# Patient Record
Sex: FEMALE | Race: BLACK OR AFRICAN AMERICAN | NOT HISPANIC OR LATINO | Employment: PART TIME | URBAN - METROPOLITAN AREA
[De-identification: names, ages, dates, MRNs, and addresses within clinical notes are randomized per-mention and may not be internally consistent; named-entity substitution may affect disease eponyms.]

---

## 2021-10-06 LAB
HUMAN PAPILLOMAVIRUS (HPV): NORMAL
PAP RECOMMENDATION EXT: NORMAL
PAP SMEAR: NORMAL

## 2022-04-07 ENCOUNTER — HISTORICAL (OUTPATIENT)
Dept: ADMINISTRATIVE | Facility: HOSPITAL | Age: 22
End: 2022-04-07
Payer: MEDICAID

## 2022-04-24 VITALS
SYSTOLIC BLOOD PRESSURE: 127 MMHG | OXYGEN SATURATION: 99 % | DIASTOLIC BLOOD PRESSURE: 86 MMHG | WEIGHT: 125.69 LBS | BODY MASS INDEX: 19.73 KG/M2 | HEIGHT: 67 IN

## 2022-07-08 ENCOUNTER — HOSPITAL ENCOUNTER (EMERGENCY)
Facility: HOSPITAL | Age: 22
Discharge: HOME | End: 2022-07-08
Attending: EMERGENCY MEDICINE
Payer: MEDICAID

## 2022-07-08 VITALS
OXYGEN SATURATION: 100 % | HEART RATE: 75 BPM | SYSTOLIC BLOOD PRESSURE: 118 MMHG | BODY MASS INDEX: 19.48 KG/M2 | DIASTOLIC BLOOD PRESSURE: 78 MMHG | TEMPERATURE: 98 F | RESPIRATION RATE: 18 BRPM | HEIGHT: 67 IN | WEIGHT: 124.13 LBS

## 2022-07-08 DIAGNOSIS — N93.9 ABNORMAL UTERINE BLEEDING: Primary | ICD-10-CM

## 2022-07-08 DIAGNOSIS — R11.0 NAUSEA: ICD-10-CM

## 2022-07-08 LAB
ALBUMIN SERPL-MCNC: 4.3 GM/DL (ref 3.5–5)
ALBUMIN/GLOB SERPL: 1.1 RATIO (ref 1.1–2)
ALP SERPL-CCNC: 45 UNIT/L (ref 40–150)
ALT SERPL-CCNC: 10 UNIT/L (ref 0–55)
APPEARANCE UR: CLEAR
AST SERPL-CCNC: 17 UNIT/L (ref 5–34)
B-HCG UR QL: NEGATIVE
BACTERIA #/AREA URNS AUTO: ABNORMAL /HPF
BASOPHILS # BLD AUTO: 0.06 X10(3)/MCL (ref 0–0.2)
BASOPHILS NFR BLD AUTO: 0.8 %
BILIRUB UR QL STRIP.AUTO: NEGATIVE MG/DL
BILIRUBIN DIRECT+TOT PNL SERPL-MCNC: 0.6 MG/DL
BUN SERPL-MCNC: 12.2 MG/DL (ref 7–18.7)
CALCIUM SERPL-MCNC: 9.8 MG/DL (ref 8.4–10.2)
CHLORIDE SERPL-SCNC: 106 MMOL/L (ref 98–107)
CO2 SERPL-SCNC: 22 MMOL/L (ref 22–29)
COLOR UR AUTO: YELLOW
CREAT SERPL-MCNC: 0.77 MG/DL (ref 0.55–1.02)
CTP QC/QA: YES
EOSINOPHIL # BLD AUTO: 0.14 X10(3)/MCL (ref 0–0.9)
EOSINOPHIL NFR BLD AUTO: 1.8 %
ERYTHROCYTE [DISTWIDTH] IN BLOOD BY AUTOMATED COUNT: 13 % (ref 11.5–17)
GLOBULIN SER-MCNC: 3.9 GM/DL (ref 2.4–3.5)
GLUCOSE SERPL-MCNC: 76 MG/DL (ref 74–100)
GLUCOSE UR QL STRIP.AUTO: NORMAL MG/DL
HCT VFR BLD AUTO: 44.8 % (ref 37–47)
HGB BLD-MCNC: 13.6 GM/DL (ref 12–16)
HYALINE CASTS #/AREA URNS LPF: ABNORMAL /LPF
IMM GRANULOCYTES # BLD AUTO: 0.03 X10(3)/MCL (ref 0–0.04)
IMM GRANULOCYTES NFR BLD AUTO: 0.4 %
KETONES UR QL STRIP.AUTO: ABNORMAL MG/DL
LEUKOCYTE ESTERASE UR QL STRIP.AUTO: NEGATIVE UNIT/L
LYMPHOCYTES # BLD AUTO: 2.69 X10(3)/MCL (ref 0.6–4.6)
LYMPHOCYTES NFR BLD AUTO: 34.1 %
MCH RBC QN AUTO: 24.7 PG (ref 27–31)
MCHC RBC AUTO-ENTMCNC: 30.4 MG/DL (ref 33–36)
MCV RBC AUTO: 81.5 FL (ref 80–94)
MONOCYTES # BLD AUTO: 0.46 X10(3)/MCL (ref 0.1–1.3)
MONOCYTES NFR BLD AUTO: 5.8 %
MUCOUS THREADS URNS QL MICRO: ABNORMAL /LPF
NEUTROPHILS # BLD AUTO: 4.5 X10(3)/MCL (ref 2.1–9.2)
NEUTROPHILS NFR BLD AUTO: 57.1 %
NITRITE UR QL STRIP.AUTO: NEGATIVE
NRBC BLD AUTO-RTO: 0 %
PH UR STRIP.AUTO: 6 [PH]
PLATELET # BLD AUTO: 405 X10(3)/MCL (ref 130–400)
PMV BLD AUTO: 10.4 FL (ref 7.4–10.4)
POTASSIUM SERPL-SCNC: 3.9 MMOL/L (ref 3.5–5.1)
PROT SERPL-MCNC: 8.2 GM/DL (ref 6.4–8.3)
PROT UR QL STRIP.AUTO: ABNORMAL MG/DL
RBC # BLD AUTO: 5.5 X10(6)/MCL (ref 4.2–5.4)
RBC #/AREA URNS AUTO: ABNORMAL /HPF
RBC UR QL AUTO: ABNORMAL UNIT/L
SODIUM SERPL-SCNC: 137 MMOL/L (ref 136–145)
SP GR UR STRIP.AUTO: 1.04
SQUAMOUS #/AREA URNS LPF: ABNORMAL /HPF
UROBILINOGEN UR STRIP-ACNC: ABNORMAL MG/DL
WBC # SPEC AUTO: 7.9 X10(3)/MCL (ref 4.5–11.5)
WBC #/AREA URNS AUTO: ABNORMAL /HPF

## 2022-07-08 PROCEDURE — 85025 COMPLETE CBC W/AUTO DIFF WBC: CPT | Performed by: PHYSICIAN ASSISTANT

## 2022-07-08 PROCEDURE — 81025 URINE PREGNANCY TEST: CPT | Performed by: PHYSICIAN ASSISTANT

## 2022-07-08 PROCEDURE — 81001 URINALYSIS AUTO W/SCOPE: CPT | Performed by: PHYSICIAN ASSISTANT

## 2022-07-08 PROCEDURE — 36415 COLL VENOUS BLD VENIPUNCTURE: CPT | Performed by: PHYSICIAN ASSISTANT

## 2022-07-08 PROCEDURE — 80053 COMPREHEN METABOLIC PANEL: CPT | Performed by: PHYSICIAN ASSISTANT

## 2022-07-08 PROCEDURE — 99284 EMERGENCY DEPT VISIT MOD MDM: CPT | Mod: 25

## 2022-07-08 RX ORDER — ONDANSETRON 4 MG/1
4 TABLET, FILM COATED ORAL EVERY 6 HOURS
Qty: 12 TABLET | Refills: 0 | Status: SHIPPED | OUTPATIENT
Start: 2022-07-08 | End: 2022-08-04 | Stop reason: SDUPTHER

## 2022-07-08 RX ORDER — DICLOFENAC SODIUM 75 MG/1
75 TABLET, DELAYED RELEASE ORAL 2 TIMES DAILY
Qty: 20 TABLET | Refills: 0 | Status: SHIPPED | OUTPATIENT
Start: 2022-07-08 | End: 2022-07-18

## 2022-07-08 NOTE — Clinical Note
"Dereck George" Chance was seen and treated in our emergency department on 7/8/2022.  She may return to work on 07/11/2022.       If you have any questions or concerns, please don't hesitate to call.      Marybel Wiseman PA-C"

## 2022-07-09 NOTE — ED PROVIDER NOTES
"Encounter Date: 7/8/2022       History     Chief Complaint   Patient presents with    Abdominal Pain     C/o vaginal bleeding since November. Also c/o continued nausea, cramping, tiredness.     Vaginal Bleeding    Nausea     Dereck Fletcher is a 21 y.o. female who presents to the ED with complaints of vaginal bleeding that started 9 month(s) ago. She reports she is followed by GYN here at [facility] and has an appt next week. She states she is currently on the Depo shot and her last injection was 3 months ago. She states she has to wear a panty liner daily due to bleeding and reports "I have one week out of each month where the bleeding is heavier." She reports intermittent nausea, cramping, and fatigue but denies all currently. Concerned she is now anemic.        The history is provided by the patient. No  was used.     Review of patient's allergies indicates:  No Known Allergies  History reviewed. No pertinent past medical history.  History reviewed. No pertinent surgical history.  History reviewed. No pertinent family history.  Social History     Tobacco Use    Smoking status: Current Every Day Smoker     Packs/day: 1.00     Types: Cigarettes    Smokeless tobacco: Never Used   Substance Use Topics    Alcohol use: Never    Drug use: Yes     Types: Marijuana     Comment: q month     Review of Systems   Constitutional: Negative for chills, fatigue and fever.   HENT: Negative for congestion, ear pain, sinus pain and sore throat.    Eyes: Negative for pain.   Respiratory: Negative for cough, chest tightness and shortness of breath.    Cardiovascular: Negative for chest pain.   Gastrointestinal: Negative for abdominal pain, constipation, diarrhea, nausea and vomiting.   Genitourinary: Positive for menstrual problem and vaginal bleeding. Negative for decreased urine volume, dysuria, frequency, pelvic pain, urgency, vaginal discharge and vaginal pain.   Musculoskeletal: Negative for back pain and " joint swelling.   Skin: Negative for color change and rash.   Neurological: Negative for dizziness, syncope, weakness, light-headedness and headaches.   Psychiatric/Behavioral: Negative for behavioral problems and confusion.       Physical Exam     Initial Vitals [07/08/22 1822]   BP Pulse Resp Temp SpO2   (!) 142/90 107 20 97.9 °F (36.6 °C) 100 %      MAP       --         Physical Exam    Constitutional: She appears well-developed and well-nourished.   HENT:   Head: Normocephalic and atraumatic.   Nose: Nose normal.   Eyes: EOM are normal. Pupils are equal, round, and reactive to light.   Neck: Neck supple. No thyromegaly present. No JVD present.   Normal range of motion.  Cardiovascular: Normal rate, regular rhythm, normal heart sounds and intact distal pulses.   No murmur heard.  Pulmonary/Chest: Breath sounds normal. No respiratory distress. She has no wheezes. She has no rhonchi. She has no rales. She exhibits no tenderness.   Abdominal: Abdomen is soft. Bowel sounds are normal. She exhibits no distension. There is no abdominal tenderness. There is no rebound and no guarding.   Musculoskeletal:         General: No tenderness or edema. Normal range of motion.      Cervical back: Normal range of motion and neck supple.     Lymphadenopathy:     She has no cervical adenopathy.   Neurological: She is alert and oriented to person, place, and time.   Skin: Skin is warm and dry. Capillary refill takes less than 2 seconds.   Psychiatric: She has a normal mood and affect. Thought content normal.         ED Course   Procedures  Labs Reviewed   COMPREHENSIVE METABOLIC PANEL - Abnormal; Notable for the following components:       Result Value    Globulin 3.9 (*)     All other components within normal limits   URINALYSIS, REFLEX TO URINE CULTURE - Abnormal; Notable for the following components:    Protein, UA Trace (*)     Ketones, UA Trace (*)     Blood, UA 2+ (*)     Urobilinogen, UA 1+ (*)     Squamous Epithelial Cells,  UA Trace (*)     Mucous, UA Moderate (*)     All other components within normal limits   CBC WITH DIFFERENTIAL - Abnormal; Notable for the following components:    RBC 5.50 (*)     MCH 24.7 (*)     MCHC 30.4 (*)     Platelet 405 (*)     All other components within normal limits   CBC W/ AUTO DIFFERENTIAL    Narrative:     The following orders were created for panel order CBC auto differential.  Procedure                               Abnormality         Status                     ---------                               -----------         ------                     CBC with Differential[[id number]]        Abnormal            Final result                 Please view results for these tests on the individual orders.   EXTRA TUBES    Narrative:     The following orders were created for panel order EXTRA TUBES.  Procedure                               Abnormality         Status                     ---------                               -----------         ------                     Light Blue Top Hold[[id number]]                              In process                 Gold Top Hold[[id number]]                                    In process                   Please view results for these tests on the individual orders.   LIGHT BLUE TOP HOLD   GOLD TOP HOLD   POCT URINE PREGNANCY          Imaging Results    None          Medications - No data to display              ED Course as of 07/08/22 2023 Fri Jul 08, 2022 2020 Reassessed patient at this time. She is sitting comfortably in the exam chair. Discussed lab results, diagnosis, and treatment plan with her. I will DC her home with Zofran and Diclofenac as needed for symptoms. Instructed her to keep her appt next week with GYN. She verbalized understanding. Strict return precautions given. Stable for discharge.  [VJ]      ED Course User Index  [VJ] Marybel Wiseman PA-C             Clinical Impression:   Final diagnoses:  [N93.9] Abnormal uterine bleeding  (Primary)  [R11.0] Nausea          ED Disposition Condition    Discharge Stable        ED Prescriptions     Medication Sig Dispense Start Date End Date Auth. Provider    diclofenac (VOLTAREN) 75 MG EC tablet Take 1 tablet (75 mg total) by mouth 2 (two) times daily. for 10 days 20 tablet 7/8/2022 7/18/2022 Marybel Wiseman PA-C    ondansetron (ZOFRAN) 4 MG tablet Take 1 tablet (4 mg total) by mouth every 6 (six) hours. 12 tablet 7/8/2022  Marybel Wiseman PA-C        Follow-up Information     Follow up With Specialties Details Why Contact Info    [facility] Emergency Dept Emergency Medicine In 3 days As needed, If symptoms worsen [address] [id number] [phone]    [facility]  In 1 week  [address] [id number]           Marybel Wiseman PA-C  07/08/22 2023

## 2022-07-14 ENCOUNTER — OFFICE VISIT (OUTPATIENT)
Dept: GYNECOLOGY | Facility: CLINIC | Age: 22
End: 2022-07-14
Payer: MEDICAID

## 2022-07-14 VITALS
WEIGHT: 120 LBS | DIASTOLIC BLOOD PRESSURE: 86 MMHG | HEIGHT: 67 IN | HEART RATE: 77 BPM | BODY MASS INDEX: 18.83 KG/M2 | SYSTOLIC BLOOD PRESSURE: 130 MMHG | TEMPERATURE: 98 F

## 2022-07-14 DIAGNOSIS — N93.9 ABNORMAL UTERINE BLEEDING (AUB): Primary | ICD-10-CM

## 2022-07-14 DIAGNOSIS — Z30.9 ENCOUNTER FOR CONTRACEPTIVE MANAGEMENT, UNSPECIFIED TYPE: ICD-10-CM

## 2022-07-14 LAB
B-HCG UR QL: NEGATIVE
CTP QC/QA: YES

## 2022-07-14 PROCEDURE — 3008F PR BODY MASS INDEX (BMI) DOCUMENTED: ICD-10-PCS | Mod: CPTII,,, | Performed by: NURSE PRACTITIONER

## 2022-07-14 PROCEDURE — 99213 PR OFFICE/OUTPT VISIT, EST, LEVL III, 20-29 MIN: ICD-10-PCS | Mod: S$PBB,,, | Performed by: NURSE PRACTITIONER

## 2022-07-14 PROCEDURE — 3079F DIAST BP 80-89 MM HG: CPT | Mod: CPTII,,, | Performed by: NURSE PRACTITIONER

## 2022-07-14 PROCEDURE — 81025 URINE PREGNANCY TEST: CPT | Mod: PBBFAC | Performed by: NURSE PRACTITIONER

## 2022-07-14 PROCEDURE — 99213 OFFICE O/P EST LOW 20 MIN: CPT | Mod: S$PBB,,, | Performed by: NURSE PRACTITIONER

## 2022-07-14 PROCEDURE — 1160F PR REVIEW ALL MEDS BY PRESCRIBER/CLIN PHARMACIST DOCUMENTED: ICD-10-PCS | Mod: CPTII,,, | Performed by: NURSE PRACTITIONER

## 2022-07-14 PROCEDURE — 3079F PR MOST RECENT DIASTOLIC BLOOD PRESSURE 80-89 MM HG: ICD-10-PCS | Mod: CPTII,,, | Performed by: NURSE PRACTITIONER

## 2022-07-14 PROCEDURE — 3075F SYST BP GE 130 - 139MM HG: CPT | Mod: CPTII,,, | Performed by: NURSE PRACTITIONER

## 2022-07-14 PROCEDURE — 1159F PR MEDICATION LIST DOCUMENTED IN MEDICAL RECORD: ICD-10-PCS | Mod: CPTII,,, | Performed by: NURSE PRACTITIONER

## 2022-07-14 PROCEDURE — 1159F MED LIST DOCD IN RCRD: CPT | Mod: CPTII,,, | Performed by: NURSE PRACTITIONER

## 2022-07-14 PROCEDURE — 3075F PR MOST RECENT SYSTOLIC BLOOD PRESS GE 130-139MM HG: ICD-10-PCS | Mod: CPTII,,, | Performed by: NURSE PRACTITIONER

## 2022-07-14 PROCEDURE — 1160F RVW MEDS BY RX/DR IN RCRD: CPT | Mod: CPTII,,, | Performed by: NURSE PRACTITIONER

## 2022-07-14 PROCEDURE — 99214 OFFICE O/P EST MOD 30 MIN: CPT | Mod: PBBFAC | Performed by: NURSE PRACTITIONER

## 2022-07-14 PROCEDURE — 3008F BODY MASS INDEX DOCD: CPT | Mod: CPTII,,, | Performed by: NURSE PRACTITIONER

## 2022-07-14 RX ORDER — NORGESTIMATE AND ETHINYL ESTRADIOL 0.25-0.035
1 KIT ORAL DAILY
Qty: 30 TABLET | Refills: 11 | Status: SHIPPED | OUTPATIENT
Start: 2022-07-14 | End: 2023-04-20

## 2022-07-14 NOTE — PROGRESS NOTES
"Patient ID: Dereck Fletcher is a 21 y.o. female.    Chief Complaint: AUB    Review of patient's allergies indicates:  No Known Allergies        HPI:  Pt is G0 scheduled today for persistent AUB on depo provera. She was started on the injections 10/2021 for dual benefit of dysmenorrhea/contraception. Pt states has had some sort of bleeding/spotting since starting the injection. She had 3 injections thus far and was scheduled today for her 4th injection but informed the nurse that she no longer wishes to continue and wanted a different method. She used OCPs in the past which helped her dysmenorrhea but states would occasionally forget to take the pill .States has some bloating and abdominal cramping. H/H on 7/8/22=13/44. TSH 3.1 on 4/2022. Denies hx of blood transfusion. Denies any medical hx. Pap 3/2022=NIL;HPV negative. GC negative. Pt states is currently bleeding today.      Review of Systems:   Negative except for findings in HPI     Objective:   /86 (BP Location: Right arm)   Pulse 77   Temp 97.9 °F (36.6 °C) (Oral)   Ht 5' 7" (1.702 m)   Wt 54.4 kg (120 lb)   LMP 07/05/2022 (Approximate)   BMI 18.79 kg/m²    Physical Exam:  GENERAL: Pt is aware and alert and  in no acute distress.  ABDOMEN: Soft, non tender.  VULVA:  No lesions or skin changes.  URETHRA: No lesions  BLADDER: No tenderness.  VAGINA: Mucosa normal,small amount of blood  CERVIX:  no CMT, NO discharge; NO lesions  BIMANUAL EXAM: reveals an 8week-sized uterus.Anteverted The uterus is mobile, nontender, no palpable masses. Theo adnexa reveal no evidence of masses; no fullness   SKIN: Warm and Dry  PSYCHIATRIC: Patient is awake and alert. Mood and affect are normal.    Assessment:   Abnormal uterine bleeding (AUB)  -     POCT urine pregnancy  -     US Pelvis Comp with Transvag NON-OB (xpd; Future; Expected date: 07/21/2022    Encounter for contraceptive management, unspecified type    Other orders  -     norgestimate-ethinyl estradioL " (ORTHO-CYCLEN) 0.25-35 mg-mcg per tablet; Take 1 tablet by mouth once daily.  Dispense: 30 tablet; Refill: 11            1. Abnormal uterine bleeding (AUB)    2. Encounter for contraceptive management, unspecified type             -discussed risks/benefits of OCPs. Pt accepts. Informed pt may take 3 months for period regulation. Will check US in the meantime.  Plan:       Follow up in about 6 months (around 1/14/2023).

## 2022-07-21 ENCOUNTER — PATIENT MESSAGE (OUTPATIENT)
Dept: ADMINISTRATIVE | Facility: OTHER | Age: 22
End: 2022-07-21
Payer: MEDICAID

## 2022-07-23 ENCOUNTER — OFFICE VISIT (OUTPATIENT)
Dept: URGENT CARE | Facility: CLINIC | Age: 22
End: 2022-07-23
Payer: MEDICAID

## 2022-07-23 VITALS
DIASTOLIC BLOOD PRESSURE: 74 MMHG | RESPIRATION RATE: 18 BRPM | BODY MASS INDEX: 20.2 KG/M2 | OXYGEN SATURATION: 99 % | WEIGHT: 128.69 LBS | TEMPERATURE: 98 F | HEART RATE: 62 BPM | HEIGHT: 67 IN | SYSTOLIC BLOOD PRESSURE: 110 MMHG

## 2022-07-23 DIAGNOSIS — J02.9 SORE THROAT: ICD-10-CM

## 2022-07-23 DIAGNOSIS — Z11.52 ENCOUNTER FOR SCREENING FOR COVID-19: ICD-10-CM

## 2022-07-23 DIAGNOSIS — R11.0 NAUSEA: ICD-10-CM

## 2022-07-23 DIAGNOSIS — J02.9 ACUTE PHARYNGITIS, UNSPECIFIED ETIOLOGY: Primary | ICD-10-CM

## 2022-07-23 DIAGNOSIS — R68.89 FLU-LIKE SYMPTOMS: ICD-10-CM

## 2022-07-23 LAB
CTP QC/QA: YES
FLUAV AG NPH QL: NEGATIVE
FLUBV AG NPH QL: NEGATIVE
MOLECULAR STREP A: NEGATIVE
SARS-COV-2 RDRP RESP QL NAA+PROBE: NEGATIVE

## 2022-07-23 PROCEDURE — 99213 OFFICE O/P EST LOW 20 MIN: CPT | Mod: S$PBB,,, | Performed by: NURSE PRACTITIONER

## 2022-07-23 PROCEDURE — 87651 STREP A DNA AMP PROBE: CPT | Mod: PBBFAC | Performed by: NURSE PRACTITIONER

## 2022-07-23 PROCEDURE — 87081 CULTURE SCREEN ONLY: CPT | Performed by: NURSE PRACTITIONER

## 2022-07-23 PROCEDURE — U0002 COVID-19 LAB TEST NON-CDC: HCPCS | Mod: PBBFAC | Performed by: NURSE PRACTITIONER

## 2022-07-23 PROCEDURE — 87804 INFLUENZA ASSAY W/OPTIC: CPT | Mod: 59,PBBFAC | Performed by: NURSE PRACTITIONER

## 2022-07-23 PROCEDURE — 99213 PR OFFICE/OUTPT VISIT, EST, LEVL III, 20-29 MIN: ICD-10-PCS | Mod: S$PBB,,, | Performed by: NURSE PRACTITIONER

## 2022-07-23 PROCEDURE — 99213 OFFICE O/P EST LOW 20 MIN: CPT | Mod: PBBFAC | Performed by: NURSE PRACTITIONER

## 2022-07-23 RX ORDER — DICLOFENAC SODIUM 25 MG/1
75 TABLET, DELAYED RELEASE ORAL 2 TIMES DAILY
COMMUNITY
End: 2023-02-26 | Stop reason: SDUPTHER

## 2022-07-23 RX ORDER — ONDANSETRON 4 MG/1
8 TABLET, ORALLY DISINTEGRATING ORAL EVERY 6 HOURS PRN
Qty: 10 TABLET | Refills: 0 | Status: SHIPPED | OUTPATIENT
Start: 2022-07-23 | End: 2022-07-28

## 2022-07-23 RX ORDER — AMOXICILLIN 500 MG/1
500 CAPSULE ORAL EVERY 8 HOURS
Qty: 30 CAPSULE | Refills: 0 | Status: SHIPPED | OUTPATIENT
Start: 2022-07-23 | End: 2022-08-02

## 2022-07-23 NOTE — PROGRESS NOTES
"Subjective:       Patient ID: Dereck Fletcher is a 21 y.o. female.    Vitals:  height is 5' 7.01" (1.702 m) and weight is 58.4 kg (128 lb 11.2 oz). Her oral temperature is 98.2 °F (36.8 °C). Her blood pressure is 110/74 and her pulse is 62. Her respiration is 18 and oxygen saturation is 99%.     Chief Complaint: Nausea, Sore Throat, and Fever    Patient is a 21-year-old female, here today for nausea, sore throat, fever over the past few days.  Patient states she has been having ongoing nausea, was previously stain and prescribe Zofran which she states she ran out of.  Has appointment with PCP in August.  Denies vomiting, diarrhea, abdominal pain, acid reflux.      Constitution: Negative.   HENT: Positive for sore throat.    Neck: neck negative.   Cardiovascular: Negative.    Respiratory: Negative.    Gastrointestinal: Positive for nausea.       Objective:      Physical Exam   Constitutional: She is oriented to person, place, and time. She appears well-developed.   HENT:   Head: Normocephalic.   Ears:   Right Ear: Tympanic membrane normal.   Left Ear: Tympanic membrane normal.   Nose: Nose normal.   Mouth/Throat: Posterior oropharyngeal erythema present.   Eyes: Conjunctivae and EOM are normal. Pupils are equal, round, and reactive to light.   Neck: Neck supple.   Cardiovascular: Normal rate, regular rhythm and normal heart sounds.   Pulmonary/Chest: Effort normal and breath sounds normal.   Abdominal: Bowel sounds are normal. Soft.   Musculoskeletal: Normal range of motion.         General: Normal range of motion.   Neurological: She is alert and oriented to person, place, and time.   Skin: Skin is warm and dry. Capillary refill takes less than 2 seconds.   Psychiatric: Her behavior is normal.   Vitals reviewed.        Assessment:       1. Acute pharyngitis, unspecified etiology    2. Encounter for screening for COVID-19    3. Flu-like symptoms    4. Sore throat    5. Nausea            Office Visit on 07/23/2022 "   Component Date Value Ref Range Status    POC Rapid COVID 07/23/2022 Negative  Negative Final     Acceptable 07/23/2022 Yes   Final    Rapid Influenza A Ag 07/23/2022 Negative  Negative Final    Rapid Influenza B Ag 07/23/2022 Negative  Negative Final     Acceptable 07/23/2022 Yes   Final    Molecular Strep A, POC 07/23/2022 Negative  Negative Final     Acceptable 07/23/2022 Yes   Final        No results found.   Plan:           Rapid Strep neg, will treat based on clinical symptoms.  Start medication, change toothbrush after 3 days of antibiotic.  Saltwater gargles.  May use acetaminophen alternate with ibuprofen as directed for comfort.  ER precautions.    Acute pharyngitis, unspecified etiology  -     amoxicillin (AMOXIL) 500 MG capsule; Take 1 capsule (500 mg total) by mouth every 8 (eight) hours. for 10 days  Dispense: 30 capsule; Refill: 0    Encounter for screening for COVID-19  -     POCT COVID-19 Rapid Screening    Flu-like symptoms  -     POCT Influenza A/B    Sore throat  -     POCT Strep A, Molecular  -     Strep Only Culture    Nausea  -     ondansetron (ZOFRAN-ODT) 4 MG TbDL; Take 2 tablets (8 mg total) by mouth every 6 (six) hours as needed (nausea).  Dispense: 10 tablet; Refill: 0

## 2022-07-25 LAB — BACTERIA THROAT CULT: NORMAL

## 2022-07-27 ENCOUNTER — TELEPHONE (OUTPATIENT)
Dept: GYNECOLOGY | Facility: CLINIC | Age: 22
End: 2022-07-27
Payer: MEDICAID

## 2022-07-27 ENCOUNTER — HOSPITAL ENCOUNTER (OUTPATIENT)
Dept: RADIOLOGY | Facility: HOSPITAL | Age: 22
Discharge: HOME | End: 2022-07-27
Attending: NURSE PRACTITIONER
Payer: MEDICAID

## 2022-07-27 DIAGNOSIS — N93.9 ABNORMAL UTERINE BLEEDING (AUB): ICD-10-CM

## 2022-07-27 PROCEDURE — 76830 TRANSVAGINAL US NON-OB: CPT | Mod: TC

## 2022-07-27 NOTE — TELEPHONE ENCOUNTER
Called pt and informed her of her results and pt understood and told her if she starts having any abnormal heavy bleeding to report to the ED  And pt understood

## 2022-07-27 NOTE — TELEPHONE ENCOUNTER
----- Message from JUANITA Phan sent at 7/27/2022 11:18 AM CDT -----  Please inform pt that her ultrasound shows two very small (less than 2cm) fibroids. For now, I recommend that she continue the OCPs and keep her f/u appt in 6 months so we can f/u on her bleeding.

## 2022-08-04 ENCOUNTER — HOSPITAL ENCOUNTER (OUTPATIENT)
Dept: RADIOLOGY | Facility: HOSPITAL | Age: 22
Discharge: HOME | End: 2022-08-04
Payer: MEDICAID

## 2022-08-04 ENCOUNTER — TELEPHONE (OUTPATIENT)
Dept: FAMILY MEDICINE | Facility: CLINIC | Age: 22
End: 2022-08-04

## 2022-08-04 ENCOUNTER — OFFICE VISIT (OUTPATIENT)
Dept: FAMILY MEDICINE | Facility: CLINIC | Age: 22
End: 2022-08-04
Payer: MEDICAID

## 2022-08-04 VITALS
HEIGHT: 67 IN | WEIGHT: 121 LBS | HEART RATE: 83 BPM | BODY MASS INDEX: 18.99 KG/M2 | TEMPERATURE: 98 F | SYSTOLIC BLOOD PRESSURE: 124 MMHG | RESPIRATION RATE: 18 BRPM | OXYGEN SATURATION: 97 % | DIASTOLIC BLOOD PRESSURE: 85 MMHG

## 2022-08-04 DIAGNOSIS — R10.30 LOWER ABDOMINAL PAIN: Primary | ICD-10-CM

## 2022-08-04 DIAGNOSIS — Z00.00 ENCOUNTER FOR WELLNESS EXAMINATION: ICD-10-CM

## 2022-08-04 DIAGNOSIS — R11.0 NAUSEA: ICD-10-CM

## 2022-08-04 DIAGNOSIS — R14.0 ABDOMINAL BLOATING: ICD-10-CM

## 2022-08-04 LAB
ALBUMIN SERPL-MCNC: 3.7 GM/DL (ref 3.5–5)
ALBUMIN/GLOB SERPL: 1 RATIO (ref 1.1–2)
ALP SERPL-CCNC: 33 UNIT/L (ref 40–150)
ALT SERPL-CCNC: 7 UNIT/L (ref 0–55)
AST SERPL-CCNC: 14 UNIT/L (ref 5–34)
BASOPHILS # BLD AUTO: 0.06 X10(3)/MCL (ref 0–0.2)
BASOPHILS NFR BLD AUTO: 1 %
BILIRUBIN DIRECT+TOT PNL SERPL-MCNC: 0.3 MG/DL
BUN SERPL-MCNC: 10.8 MG/DL (ref 7–18.7)
CALCIUM SERPL-MCNC: 9.4 MG/DL (ref 8.4–10.2)
CHLORIDE SERPL-SCNC: 107 MMOL/L (ref 98–107)
CHOLEST SERPL-MCNC: 282 MG/DL
CHOLEST/HDLC SERPL: 6 {RATIO} (ref 0–5)
CO2 SERPL-SCNC: 25 MMOL/L (ref 22–29)
CREAT SERPL-MCNC: 0.72 MG/DL (ref 0.55–1.02)
DEPRECATED CALCIDIOL+CALCIFEROL SERPL-MC: 27.3 NG/ML (ref 30–80)
EOSINOPHIL # BLD AUTO: 0.18 X10(3)/MCL (ref 0–0.9)
EOSINOPHIL NFR BLD AUTO: 3 %
ERYTHROCYTE [DISTWIDTH] IN BLOOD BY AUTOMATED COUNT: 12.9 % (ref 11.5–17)
EST. AVERAGE GLUCOSE BLD GHB EST-MCNC: 91.1 MG/DL
FERRITIN SERPL-MCNC: 7.77 NG/ML (ref 4.63–204)
GLOBULIN SER-MCNC: 3.6 GM/DL (ref 2.4–3.5)
GLUCOSE SERPL-MCNC: 86 MG/DL (ref 74–100)
HAV IGM SERPL QL IA: NONREACTIVE
HBA1C MFR BLD: 4.8 %
HBV CORE IGM SERPL QL IA: NONREACTIVE
HBV SURFACE AG SERPL QL IA: NONREACTIVE
HCT VFR BLD AUTO: 38.3 % (ref 37–47)
HCV AB SERPL QL IA: NONREACTIVE
HDLC SERPL-MCNC: 47 MG/DL (ref 35–60)
HGB BLD-MCNC: 12.1 GM/DL (ref 12–16)
HIV 1+2 AB+HIV1 P24 AG SERPL QL IA: NONREACTIVE
IMM GRANULOCYTES # BLD AUTO: 0.02 X10(3)/MCL (ref 0–0.04)
IMM GRANULOCYTES NFR BLD AUTO: 0.3 %
IRON SATN MFR SERPL: 8 % (ref 20–50)
IRON SERPL-MCNC: 27 UG/DL (ref 50–170)
LDLC SERPL CALC-MCNC: 222 MG/DL (ref 50–140)
LYMPHOCYTES # BLD AUTO: 1.84 X10(3)/MCL (ref 0.6–4.6)
LYMPHOCYTES NFR BLD AUTO: 30.9 %
MCH RBC QN AUTO: 25.3 PG (ref 27–31)
MCHC RBC AUTO-ENTMCNC: 31.6 MG/DL (ref 33–36)
MCV RBC AUTO: 80 FL (ref 80–94)
MONOCYTES # BLD AUTO: 0.38 X10(3)/MCL (ref 0.1–1.3)
MONOCYTES NFR BLD AUTO: 6.4 %
NEUTROPHILS # BLD AUTO: 3.5 X10(3)/MCL (ref 2.1–9.2)
NEUTROPHILS NFR BLD AUTO: 58.4 %
NRBC BLD AUTO-RTO: 0 %
PLATELET # BLD AUTO: 361 X10(3)/MCL (ref 130–400)
PMV BLD AUTO: 10 FL (ref 7.4–10.4)
POTASSIUM SERPL-SCNC: 4.9 MMOL/L (ref 3.5–5.1)
PROT SERPL-MCNC: 7.3 GM/DL (ref 6.4–8.3)
RBC # BLD AUTO: 4.79 X10(6)/MCL (ref 4.2–5.4)
SODIUM SERPL-SCNC: 138 MMOL/L (ref 136–145)
T PALLIDUM AB SER QL: NONREACTIVE
T4 FREE SERPL-MCNC: 1 NG/DL (ref 0.7–1.48)
TIBC SERPL-MCNC: 311 UG/DL (ref 70–310)
TIBC SERPL-MCNC: 338 UG/DL (ref 250–450)
TRANSFERRIN SERPL-MCNC: 309 MG/DL (ref 180–382)
TRIGL SERPL-MCNC: 66 MG/DL (ref 37–140)
TSH SERPL-ACNC: 1.15 UIU/ML (ref 0.35–4.94)
VLDLC SERPL CALC-MCNC: 13 MG/DL
WBC # SPEC AUTO: 6 X10(3)/MCL (ref 4.5–11.5)

## 2022-08-04 PROCEDURE — 82306 VITAMIN D 25 HYDROXY: CPT

## 2022-08-04 PROCEDURE — 1159F PR MEDICATION LIST DOCUMENTED IN MEDICAL RECORD: ICD-10-PCS | Mod: CPTII,,,

## 2022-08-04 PROCEDURE — 3079F PR MOST RECENT DIASTOLIC BLOOD PRESSURE 80-89 MM HG: ICD-10-PCS | Mod: CPTII,,,

## 2022-08-04 PROCEDURE — 1159F MED LIST DOCD IN RCRD: CPT | Mod: CPTII,,,

## 2022-08-04 PROCEDURE — 85025 COMPLETE CBC W/AUTO DIFF WBC: CPT

## 2022-08-04 PROCEDURE — 3074F PR MOST RECENT SYSTOLIC BLOOD PRESSURE < 130 MM HG: ICD-10-PCS | Mod: CPTII,,,

## 2022-08-04 PROCEDURE — 87389 HIV-1 AG W/HIV-1&-2 AB AG IA: CPT

## 2022-08-04 PROCEDURE — 83036 HEMOGLOBIN GLYCOSYLATED A1C: CPT

## 2022-08-04 PROCEDURE — 3074F SYST BP LT 130 MM HG: CPT | Mod: CPTII,,,

## 2022-08-04 PROCEDURE — 1160F PR REVIEW ALL MEDS BY PRESCRIBER/CLIN PHARMACIST DOCUMENTED: ICD-10-PCS | Mod: CPTII,,,

## 2022-08-04 PROCEDURE — 74019 RADEX ABDOMEN 2 VIEWS: CPT | Mod: TC,PN

## 2022-08-04 PROCEDURE — 99214 PR OFFICE/OUTPT VISIT, EST, LEVL IV, 30-39 MIN: ICD-10-PCS | Mod: S$PBB,,,

## 2022-08-04 PROCEDURE — 36415 COLL VENOUS BLD VENIPUNCTURE: CPT

## 2022-08-04 PROCEDURE — 86780 TREPONEMA PALLIDUM: CPT

## 2022-08-04 PROCEDURE — 1160F RVW MEDS BY RX/DR IN RCRD: CPT | Mod: CPTII,,,

## 2022-08-04 PROCEDURE — 80061 LIPID PANEL: CPT

## 2022-08-04 PROCEDURE — 99214 OFFICE O/P EST MOD 30 MIN: CPT | Mod: S$PBB,,,

## 2022-08-04 PROCEDURE — 84439 ASSAY OF FREE THYROXINE: CPT

## 2022-08-04 PROCEDURE — 83540 ASSAY OF IRON: CPT

## 2022-08-04 PROCEDURE — 84443 ASSAY THYROID STIM HORMONE: CPT

## 2022-08-04 PROCEDURE — 82728 ASSAY OF FERRITIN: CPT

## 2022-08-04 PROCEDURE — 99214 OFFICE O/P EST MOD 30 MIN: CPT | Mod: PBBFAC,PN

## 2022-08-04 PROCEDURE — 3079F DIAST BP 80-89 MM HG: CPT | Mod: CPTII,,,

## 2022-08-04 PROCEDURE — 3008F PR BODY MASS INDEX (BMI) DOCUMENTED: ICD-10-PCS | Mod: CPTII,,,

## 2022-08-04 PROCEDURE — 80074 ACUTE HEPATITIS PANEL: CPT

## 2022-08-04 PROCEDURE — 3008F BODY MASS INDEX DOCD: CPT | Mod: CPTII,,,

## 2022-08-04 PROCEDURE — 80053 COMPREHEN METABOLIC PANEL: CPT

## 2022-08-04 RX ORDER — IBUPROFEN 600 MG/1
600 TABLET ORAL
COMMUNITY
Start: 2022-01-11 | End: 2023-02-26 | Stop reason: ALTCHOICE

## 2022-08-04 RX ORDER — ONDANSETRON HYDROCHLORIDE 8 MG/1
8 TABLET, FILM COATED ORAL EVERY 12 HOURS PRN
Qty: 15 TABLET | Refills: 0 | Status: SHIPPED | OUTPATIENT
Start: 2022-08-04 | End: 2022-08-16

## 2022-08-04 RX ORDER — LEVOCETIRIZINE DIHYDROCHLORIDE 5 MG/1
TABLET, FILM COATED ORAL
COMMUNITY
Start: 2022-03-14 | End: 2023-04-20

## 2022-08-04 NOTE — PROGRESS NOTES
Patient Name: Dereck Fletcher   : 2000  MRN: [id number]     Subjective:   Patient ID: Dereck Fletcher is a 21 y.o. female.    Chief Complaint:   Chief Complaint   Patient presents with    Establish Care        HPI: 2022:  Patient presents today to be established for primary care her biggest complaint is abnormal uterine bleeding.  She is followed by the women[facility] health clinic for this issue who is managing her care.  Discussed with patient the need to be patient while workups and medications are being trialed.  Patient has recently had a transvaginal ultrasound and the results were communicated to her by her gynecologist.  Patient states that she is frustrated because she has been dealing with this since November, per chart review patient did miss multiple appointments for Depo injections due to uterine bleeding so she only recently switched to oral contraceptive pills.  She has been on that for about a month in sees minimal improvement, at length discussion about remaining patient while the medication is given time to work.  Will check her blood work today to ensure she is not anemic from blood loss, patient states that she uses panty liners in has inconsistent bleeding. Otherwise patient states she is bloated feeling often and does not think the US she had completed looked at the right area.  She states that she also has pretty consistent nausea that is relieved by Zofran.  Denies any vomiting, diarrhea, blood in stool or urine.  Patient denies any changes in her dietary habits.      ROS:  Review of Systems   Constitutional: Negative for chills, fever and weight loss.   HENT: Negative for ear discharge, nosebleeds and tinnitus.    Eyes: Negative for blurred vision, photophobia and pain.   Respiratory: Negative for cough, shortness of breath, wheezing and stridor.    Cardiovascular: Negative for chest pain, palpitations and orthopnea.   Gastrointestinal: Positive for abdominal pain and nausea.  "Negative for blood in stool, constipation, diarrhea, heartburn and vomiting.   Genitourinary: Negative for dysuria, frequency and urgency.   Musculoskeletal: Negative for falls and myalgias.   Skin: Negative for itching and rash.   Neurological: Negative for dizziness, sensory change, speech change, focal weakness, seizures, weakness and headaches.   Endo/Heme/Allergies: Negative for environmental allergies. Does not bruise/bleed easily.   Psychiatric/Behavioral: Negative for hallucinations and suicidal ideas.      History:   History reviewed. No pertinent past medical history.   History reviewed. No pertinent surgical history.  Family History   Problem Relation Age of Onset    Diabetes Father       Social History     Tobacco Use    Smoking status: Current Every Day Smoker     Packs/day: 1.00     Types: Cigarettes    Smokeless tobacco: Never Used   Substance and Sexual Activity    Alcohol use: Never    Drug use: Yes     Types: Marijuana     Comment: q month    Sexual activity: Not Currently     Birth control/protection: Injection        Allergies: Review of patient's allergies indicates:  No Known Allergies  Objective:     Vitals:    08/04/22 0823   BP: 124/85   Pulse: 83   Resp: 18   Temp: 98.2 °F (36.8 °C)   SpO2: 97%   Weight: 54.9 kg (121 lb)   Height: 5' 7" (1.702 m)     Body mass index is 18.95 kg/m².     Physical Examination:   Physical Exam  Vitals reviewed.   Constitutional:       Appearance: Normal appearance. She is normal weight.   HENT:      Head: Normocephalic.      Right Ear: Tympanic membrane, ear canal and external ear normal.      Left Ear: Tympanic membrane, ear canal and external ear normal.      Nose: Nose normal.      Mouth/Throat:      Mouth: Mucous membranes are moist.      Pharynx: Oropharynx is clear.   Eyes:      Extraocular Movements: Extraocular movements intact.      Conjunctiva/sclera: Conjunctivae normal.      Pupils: Pupils are equal, round, and reactive to light. "   Cardiovascular:      Rate and Rhythm: Normal rate and regular rhythm.      Pulses: Normal pulses.      Heart sounds: Normal heart sounds.   Pulmonary:      Effort: Pulmonary effort is normal.      Breath sounds: Normal breath sounds.   Abdominal:      General: Abdomen is flat. Bowel sounds are normal.      Palpations: Abdomen is soft.   Musculoskeletal:         General: Normal range of motion.      Cervical back: Normal range of motion and neck supple.   Skin:     General: Skin is warm and dry.   Neurological:      General: No focal deficit present.      Mental Status: She is alert and oriented to person, place, and time.   Psychiatric:         Mood and Affect: Mood normal.         Behavior: Behavior normal.         Assessment:     Problem List Items Addressed This Visit        GI    Nausea    Relevant Medications    ondansetron (ZOFRAN) 8 MG tablet    RESOLVED: Lower abdominal pain - Primary    Relevant Orders    X-Ray Abdomen Flat And Erect (Completed)    US Abdomen Limited      Other Visit Diagnoses     Abdominal bloating        Relevant Orders    US Abdomen Limited    Encounter for wellness examination        Relevant Orders    TSH    T4, Free    Hemoglobin A1C    SYPHILIS ANTIBODY (WITH REFLEX RPR)    Hepatitis Panel, Acute    Lipid Panel    CBC Auto Differential    Comprehensive Metabolic Panel    HIV 1/2 Ag/Ab (4th Gen)    Vitamin D    Ferritin    Iron and TIBC          Plan:   Dereck was seen today for establish care.    Diagnoses and all orders for this visit:    Lower abdominal pain  -     X-Ray Abdomen Flat And Erect  -     US Abdomen Limited; Future    Abdominal bloating  -     US Abdomen Limited; Future    Encounter for wellness examination  -     TSH  -     T4, Free  -     Hemoglobin A1C  -     SYPHILIS ANTIBODY (WITH REFLEX RPR)  -     Hepatitis Panel, Acute  -     Lipid Panel  -     CBC Auto Differential  -     Comprehensive Metabolic Panel  -     HIV 1/2 Ag/Ab (4th Gen)  -     Vitamin D  -      Ferritin  -     Iron and TIBC    Nausea  -     ondansetron (ZOFRAN) 8 MG tablet; Take 1 tablet (8 mg total) by mouth every 12 (twelve) hours as needed for Nausea.       Follow up in about 2 weeks (around 8/18/2022) for review labs.       This note was created with the assistance of a voice recognition software or phone dictation. There may be transcription errors as a result of using this technology however minimal. Effort has been made to assure accuracy of transcription but any obvious errors or omissions should be clarified with the author of the document

## 2022-08-05 LAB — PATH REV: NORMAL

## 2022-08-16 ENCOUNTER — OFFICE VISIT (OUTPATIENT)
Dept: FAMILY MEDICINE | Facility: CLINIC | Age: 22
End: 2022-08-16
Payer: MEDICAID

## 2022-08-16 VITALS
WEIGHT: 122.13 LBS | OXYGEN SATURATION: 100 % | HEART RATE: 83 BPM | DIASTOLIC BLOOD PRESSURE: 77 MMHG | HEIGHT: 67 IN | SYSTOLIC BLOOD PRESSURE: 111 MMHG | TEMPERATURE: 98 F | RESPIRATION RATE: 20 BRPM | BODY MASS INDEX: 19.17 KG/M2

## 2022-08-16 DIAGNOSIS — N92.6 IRREGULAR MENSTRUAL BLEEDING: ICD-10-CM

## 2022-08-16 DIAGNOSIS — R11.0 NAUSEA: Primary | ICD-10-CM

## 2022-08-16 PROCEDURE — 1159F MED LIST DOCD IN RCRD: CPT | Mod: CPTII,,,

## 2022-08-16 PROCEDURE — 99214 OFFICE O/P EST MOD 30 MIN: CPT | Mod: S$PBB,,,

## 2022-08-16 PROCEDURE — 3078F PR MOST RECENT DIASTOLIC BLOOD PRESSURE < 80 MM HG: ICD-10-PCS | Mod: CPTII,,,

## 2022-08-16 PROCEDURE — 1159F PR MEDICATION LIST DOCUMENTED IN MEDICAL RECORD: ICD-10-PCS | Mod: CPTII,,,

## 2022-08-16 PROCEDURE — 3008F PR BODY MASS INDEX (BMI) DOCUMENTED: ICD-10-PCS | Mod: CPTII,,,

## 2022-08-16 PROCEDURE — 1160F RVW MEDS BY RX/DR IN RCRD: CPT | Mod: CPTII,,,

## 2022-08-16 PROCEDURE — 3008F BODY MASS INDEX DOCD: CPT | Mod: CPTII,,,

## 2022-08-16 PROCEDURE — 3074F PR MOST RECENT SYSTOLIC BLOOD PRESSURE < 130 MM HG: ICD-10-PCS | Mod: CPTII,,,

## 2022-08-16 PROCEDURE — 99214 OFFICE O/P EST MOD 30 MIN: CPT | Mod: PBBFAC,PN

## 2022-08-16 PROCEDURE — 99214 PR OFFICE/OUTPT VISIT, EST, LEVL IV, 30-39 MIN: ICD-10-PCS | Mod: S$PBB,,,

## 2022-08-16 PROCEDURE — 1160F PR REVIEW ALL MEDS BY PRESCRIBER/CLIN PHARMACIST DOCUMENTED: ICD-10-PCS | Mod: CPTII,,,

## 2022-08-16 PROCEDURE — 3078F DIAST BP <80 MM HG: CPT | Mod: CPTII,,,

## 2022-08-16 PROCEDURE — 3074F SYST BP LT 130 MM HG: CPT | Mod: CPTII,,,

## 2022-08-16 RX ORDER — ONDANSETRON 4 MG/1
4 TABLET, ORALLY DISINTEGRATING ORAL EVERY 12 HOURS PRN
Qty: 8 TABLET | Refills: 0 | Status: SHIPPED | OUTPATIENT
Start: 2022-08-16 | End: 2023-04-20

## 2022-08-16 NOTE — ASSESSMENT & PLAN NOTE
Advised to contact [facility] for sooner appointment as she is not consistent with any treatment plans for her irregular bleeding. She does not want Depo and is non compliant with OCP.

## 2022-08-16 NOTE — PROGRESS NOTES
"Patient Name: Dereck Fletcher   : 2000  MRN: [id number]     Subjective:   Patient ID: Dereck Fletcher is a 21 y.o. female.    Chief Complaint:   Chief Complaint   Patient presents with    Follow-up     Nausea from meds          HPI: 2022:  Patient return to clinic today states that she is no longer taking the Zofran or Ortho-Tri-Cyclen, she states that the Zofran "made her feel bad" and the oral birth control made her nauseous.  Explained that she is not nauseous when she is not taking medicine.  Patient is requesting refill of nausea medicine stating that she was unable to tolerate the 8 mg Zofran that she requested.  Being that patient is not nauseous when she is not taking medicine will refill this 1 time for her only.  She elaborates on lots of anxiety around her regular bleeding and pelvic ultrasound that was performed.  At length discussion about compliance and sticking with treatment plan.  Patient is not open to receiving any treatment that can be performed in primary care setting such as Depo or OCPs, she will need [facility] for further discussion and management of her irregular bleeding.  Explain to her that she will need to be patient with treatment plan an allow medications to have time to work.  Also explained to patient that pelvic ultrasound was as previous provider stated a normal finding, patient has lot of anxiety that she has "tumor that will just get worse".      2022:  Patient presents today to be established for primary care her biggest complaint is abnormal uterine bleeding.  She is followed by the women's health clinic for this issue who is managing her care.  Discussed with patient the need to be patient while workups and medications are being trialed.  Patient has recently had a transvaginal ultrasound and the results were communicated to her by her gynecologist.  Patient states that she is frustrated because she has been dealing with " this since November, per chart review patient did miss multiple appointments for Depo injections due to uterine bleeding so she only recently switched to oral contraceptive pills.  She has been on that for about a month in sees minimal improvement, at length discussion about remaining patient while the medication is given time to work.  Will check her blood work today to ensure she is not anemic from blood loss, patient states that she uses panty liners in has inconsistent bleeding. Otherwise patient states she is bloated feeling often and does not think the US she had completed looked at the right area.  She states that she also has pretty consistent nausea that is relieved by Zofran.  Denies any vomiting, diarrhea, blood in stool or urine.  Patient denies any changes in her dietary habits.      ROS:  Review of Systems   Constitutional: Negative for chills, fever and weight loss.   HENT: Negative for ear discharge, nosebleeds and tinnitus.    Eyes: Negative for blurred vision, photophobia and pain.   Respiratory: Negative for cough, shortness of breath, wheezing and stridor.    Cardiovascular: Negative for chest pain, palpitations and orthopnea.   Gastrointestinal: Positive for abdominal pain and nausea. Negative for blood in stool, constipation, diarrhea, heartburn and vomiting.   Genitourinary: Negative for dysuria, frequency and urgency.   Musculoskeletal: Negative for falls and myalgias.   Skin: Negative for itching and rash.   Neurological: Negative for dizziness, sensory change, speech change, focal weakness, seizures, weakness and headaches.   Endo/Heme/Allergies: Negative for environmental allergies. Does not bruise/bleed easily.   Psychiatric/Behavioral: Negative for hallucinations and suicidal ideas.      History:   History reviewed. No pertinent past medical history.   History reviewed. No pertinent surgical history.  Family History   Problem Relation Age of Onset    Diabetes Father       Social History  "    Tobacco Use    Smoking status: Current Every Day Smoker     Packs/day: 1.00     Types: Cigarettes    Smokeless tobacco: Never Used   Substance and Sexual Activity    Alcohol use: Never    Drug use: Yes     Types: Marijuana     Comment: q month    Sexual activity: Not Currently     Birth control/protection: Injection        Allergies: Review of patient's allergies indicates:  No Known Allergies  Objective:     Vitals:    08/16/22 1215   BP: 111/77   Pulse: 83   Resp: 20   Temp: 98.4 °F (36.9 °C)   TempSrc: Oral   SpO2: 100%   Weight: 55.4 kg (122 lb 1.6 oz)   Height: 5' 7" (1.702 m)     Body mass index is 19.12 kg/m².     Physical Examination:   Physical Exam  Vitals reviewed.   Constitutional:       Appearance: Normal appearance. She is normal weight.   HENT:      Head: Normocephalic.      Right Ear: Tympanic membrane, ear canal and external ear normal.      Left Ear: Tympanic membrane, ear canal and external ear normal.      Nose: Nose normal.      Mouth/Throat:      Mouth: Mucous membranes are moist.      Pharynx: Oropharynx is clear.   Eyes:      Extraocular Movements: Extraocular movements intact.      Conjunctiva/sclera: Conjunctivae normal.      Pupils: Pupils are equal, round, and reactive to light.   Cardiovascular:      Rate and Rhythm: Normal rate and regular rhythm.      Pulses: Normal pulses.      Heart sounds: Normal heart sounds.   Pulmonary:      Effort: Pulmonary effort is normal.      Breath sounds: Normal breath sounds.   Abdominal:      General: Abdomen is flat. Bowel sounds are normal.      Palpations: Abdomen is soft.   Musculoskeletal:         General: Normal range of motion.      Cervical back: Normal range of motion and neck supple.   Skin:     General: Skin is warm and dry.   Neurological:      General: No focal deficit present.      Mental Status: She is alert and oriented to person, place, and time.   Psychiatric:         Mood and Affect: Mood normal.         Behavior: Behavior " normal.         Assessment:     Problem List Items Addressed This Visit        Renal/    Irregular menstrual bleeding    Current Assessment & Plan     Advised to contact [facility] for sooner appointment as she is not consistent with any treatment plans for her irregular bleeding. She does not want Depo and is non compliant with OCP.              GI    Nausea - Primary    Current Assessment & Plan     patient is nauseous when she was taking birth control; she has stopped taking so will refill this medication one more time for her with no refills.  GI most likely from anxiety           Relevant Medications    ondansetron (ZOFRAN-ODT) 4 MG TbDL          Plan:   Dereck was seen today for follow-up.    Diagnoses and all orders for this visit:    Nausea  -     ondansetron (ZOFRAN-ODT) 4 MG TbDL; Take 1 tablet (4 mg total) by mouth every 12 (twelve) hours as needed (nausea).    Irregular menstrual bleeding       Follow up in about 6 months (around 2/16/2023) for routine labs recheck.       This note was created with the assistance of a voice recognition software or phone dictation. There may be transcription errors as a result of using this technology however minimal. Effort has been made to assure accuracy of transcription but any obvious errors or omissions should be clarified with the author of the document

## 2022-08-16 NOTE — ASSESSMENT & PLAN NOTE
patient is nauseous when she was taking birth control; she has stopped taking so will refill this medication one more time for her with no refills.  GI most likely from anxiety

## 2022-10-11 ENCOUNTER — TELEPHONE (OUTPATIENT)
Dept: GYNECOLOGY | Facility: CLINIC | Age: 22
End: 2022-10-11

## 2022-10-11 ENCOUNTER — OFFICE VISIT (OUTPATIENT)
Dept: GYNECOLOGY | Facility: CLINIC | Age: 22
End: 2022-10-11
Payer: MEDICAID

## 2022-10-11 VITALS
BODY MASS INDEX: 19.28 KG/M2 | DIASTOLIC BLOOD PRESSURE: 81 MMHG | HEART RATE: 85 BPM | TEMPERATURE: 98 F | HEIGHT: 67 IN | OXYGEN SATURATION: 98 % | WEIGHT: 122.81 LBS | RESPIRATION RATE: 16 BRPM | SYSTOLIC BLOOD PRESSURE: 114 MMHG

## 2022-10-11 DIAGNOSIS — N93.9 ABNORMAL UTERINE BLEEDING (AUB): ICD-10-CM

## 2022-10-11 DIAGNOSIS — Z01.419 WOMEN'S ANNUAL ROUTINE GYNECOLOGICAL EXAMINATION: Primary | ICD-10-CM

## 2022-10-11 LAB
B-HCG UR QL: NEGATIVE
BILIRUB SERPL-MCNC: NORMAL MG/DL
BLOOD URINE, POC: NORMAL
C TRACH DNA SPEC QL NAA+PROBE: NOT DETECTED
CLUE CELLS VAG QL WET PREP: ABNORMAL
COLOR, POC UA: YELLOW
CTP QC/QA: YES
GLUCOSE UR QL STRIP: NORMAL
KETONES UR QL STRIP: NORMAL
LEUKOCYTE ESTERASE URINE, POC: NORMAL
N GONORRHOEA DNA SPEC QL NAA+PROBE: NOT DETECTED
NITRITE, POC UA: NORMAL
PH, POC UA: 5.5
PROTEIN, POC: NORMAL
SPECIFIC GRAVITY, POC UA: >=1.03
T VAGINALIS VAG QL WET PREP: ABNORMAL
UROBILINOGEN, POC UA: NORMAL
WBC #/AREA VAG WET PREP: ABNORMAL
YEAST SPEC QL WET PREP: ABNORMAL

## 2022-10-11 PROCEDURE — 87591 N.GONORRHOEAE DNA AMP PROB: CPT | Performed by: NURSE PRACTITIONER

## 2022-10-11 PROCEDURE — 99214 OFFICE O/P EST MOD 30 MIN: CPT | Mod: PBBFAC | Performed by: NURSE PRACTITIONER

## 2022-10-11 PROCEDURE — 1159F PR MEDICATION LIST DOCUMENTED IN MEDICAL RECORD: ICD-10-PCS | Mod: CPTII,,, | Performed by: NURSE PRACTITIONER

## 2022-10-11 PROCEDURE — 81001 URINALYSIS AUTO W/SCOPE: CPT | Mod: PBBFAC | Performed by: NURSE PRACTITIONER

## 2022-10-11 PROCEDURE — 87210 SMEAR WET MOUNT SALINE/INK: CPT | Performed by: NURSE PRACTITIONER

## 2022-10-11 PROCEDURE — 87491 CHLMYD TRACH DNA AMP PROBE: CPT | Performed by: NURSE PRACTITIONER

## 2022-10-11 PROCEDURE — 3079F PR MOST RECENT DIASTOLIC BLOOD PRESSURE 80-89 MM HG: ICD-10-PCS | Mod: CPTII,,, | Performed by: NURSE PRACTITIONER

## 2022-10-11 PROCEDURE — 1160F RVW MEDS BY RX/DR IN RCRD: CPT | Mod: CPTII,,, | Performed by: NURSE PRACTITIONER

## 2022-10-11 PROCEDURE — 81025 URINE PREGNANCY TEST: CPT | Mod: PBBFAC | Performed by: NURSE PRACTITIONER

## 2022-10-11 PROCEDURE — 99395 PR PREVENTIVE VISIT,EST,18-39: ICD-10-PCS | Mod: S$PBB,,, | Performed by: NURSE PRACTITIONER

## 2022-10-11 PROCEDURE — 3008F BODY MASS INDEX DOCD: CPT | Mod: CPTII,,, | Performed by: NURSE PRACTITIONER

## 2022-10-11 PROCEDURE — 3008F PR BODY MASS INDEX (BMI) DOCUMENTED: ICD-10-PCS | Mod: CPTII,,, | Performed by: NURSE PRACTITIONER

## 2022-10-11 PROCEDURE — 3074F SYST BP LT 130 MM HG: CPT | Mod: CPTII,,, | Performed by: NURSE PRACTITIONER

## 2022-10-11 PROCEDURE — 1160F PR REVIEW ALL MEDS BY PRESCRIBER/CLIN PHARMACIST DOCUMENTED: ICD-10-PCS | Mod: CPTII,,, | Performed by: NURSE PRACTITIONER

## 2022-10-11 PROCEDURE — 3079F DIAST BP 80-89 MM HG: CPT | Mod: CPTII,,, | Performed by: NURSE PRACTITIONER

## 2022-10-11 PROCEDURE — 99395 PREV VISIT EST AGE 18-39: CPT | Mod: S$PBB,,, | Performed by: NURSE PRACTITIONER

## 2022-10-11 PROCEDURE — 3074F PR MOST RECENT SYSTOLIC BLOOD PRESSURE < 130 MM HG: ICD-10-PCS | Mod: CPTII,,, | Performed by: NURSE PRACTITIONER

## 2022-10-11 PROCEDURE — 1159F MED LIST DOCD IN RCRD: CPT | Mod: CPTII,,, | Performed by: NURSE PRACTITIONER

## 2022-10-11 RX ORDER — METRONIDAZOLE 500 MG/1
500 TABLET ORAL EVERY 12 HOURS
Qty: 14 TABLET | Refills: 0 | Status: SHIPPED | OUTPATIENT
Start: 2022-10-11 | End: 2022-10-18

## 2022-10-11 NOTE — PROGRESS NOTES
"Patient ID: Dereck Fletcher is a 22 y.o. female.    Chief Complaint: Well Woman      Review of patient's allergies indicates:  No Known Allergies          HPI:  The patient is G0 scheduled for her annual gyn exam today. Pt was seen 10/2021 by gyn resident clinic and started on depo provera for contraception. She saw me in April with c/o AUB on depo provera. Pt was educated on expected AUB after beginning the contraception and she was encouraged to continue. She was seen again in July for same complaint and opted to switch to OCPs. Pt was again informed of typical rusty period of AUB when starting OCPs (3 months). Pt states did not finish the first pack as her bleeding continued.  She had a pelvic US which showed two small fibroids. She does not want any medical treatment but is very concerned about the fibroids and would like to  consult with gyn. She states her periods are now monthly lasting 6 days and are heavy with blood clots. Recent hbg/hct was 12/38 on 8/2022. Pt is sexually active. She is not interested in any other method of contraception at this time. She also reports dysmenorrhea since one year after the onset of her first menses. She currently takes one otc ibuprofen daily during her menses to help with pain.  Review of Systems:   Negative except for findings in HPI     Objective:   /81   Pulse 85   Temp 98.1 °F (36.7 °C)   Resp 16   Ht 5' 7" (1.702 m)   Wt 55.7 kg (122 lb 12.8 oz)   LMP 09/26/2022   SpO2 98%   BMI 19.23 kg/m²    Physical Exam:  GENERAL: Pt is aware and alert and  in no acute distress.  BREASTS: Bilateral-No masses, nipple discharge, skin changes, tenderness.  ABDOMEN: Soft, non tender.  VULVA:  No lesions or skin changes.  URETHRA: No lesions  BLADDER: No tenderness.  VAGINA: Mucosa normal,white discharge; nolesions.  CERVIX:  no CMT, NO discharge; NO lesions  BIMANUAL EXAM: reveals a 10 week-sized anteverted uterus. The uterus is mobile, nontender, no palpable masses. " Theo adnexa reveal no evidence of masses; no fullness   SKIN: Warm and Dry  PSYCHIATRIC: Patient is awake and alert. Mood and affect are normal.    Assessment:   Women's annual routine gynecological examination    Abnormal uterine bleeding (AUB)  -     Wet Prep, Genital  -     Chlamydia/GC, PCR  -     POCT urine pregnancy  -     POCT urinalysis, dipstick or tablet reag            1. Women's annual routine gynecological examination    2. Abnormal uterine bleeding (AUB)               -gyn appt scheduled for Dec 2nd @1015 am and given to pt  -declined any other methods of contraception at this time; upt negative  -encouraged otc prenatal vitamin daily if having unprotected intercourse; also informed pt that she may take 600mg (3tablets) otc ibuprofen every 6 hours as needed for dysmenorrhea  -declined hiv/hep/rpr screening  Plan:       Follow up in about 1 year (around 10/11/2023) for one year for annual with NP and next available with gyn for AUB.

## 2022-10-11 NOTE — TELEPHONE ENCOUNTER
Inform pt of BV. Please instruct pt to avoid douching or scented products to vaginal area. I have sent a flagyl prescription the pharmacy on file. She should avoid alcohol while taking this medication and up to 48 hours after completion.

## 2022-12-03 ENCOUNTER — DOCUMENTATION ONLY (OUTPATIENT)
Dept: INTERNAL MEDICINE | Facility: CLINIC | Age: 22
End: 2022-12-03
Payer: MEDICAID

## 2023-01-04 ENCOUNTER — OFFICE VISIT (OUTPATIENT)
Dept: GYNECOLOGY | Facility: CLINIC | Age: 23
End: 2023-01-04
Payer: MEDICAID

## 2023-01-04 VITALS
DIASTOLIC BLOOD PRESSURE: 73 MMHG | BODY MASS INDEX: 19.3 KG/M2 | OXYGEN SATURATION: 99 % | WEIGHT: 123 LBS | TEMPERATURE: 98 F | HEIGHT: 67 IN | RESPIRATION RATE: 20 BRPM | HEART RATE: 90 BPM | SYSTOLIC BLOOD PRESSURE: 108 MMHG

## 2023-01-04 DIAGNOSIS — N94.6 DYSMENORRHEA: ICD-10-CM

## 2023-01-04 DIAGNOSIS — D21.9 FIBROID: ICD-10-CM

## 2023-01-04 DIAGNOSIS — R45.86 MOOD CHANGE: ICD-10-CM

## 2023-01-04 DIAGNOSIS — N94.10 DYSPAREUNIA IN FEMALE: ICD-10-CM

## 2023-01-04 PROBLEM — N92.6 IRREGULAR MENSTRUAL BLEEDING: Status: RESOLVED | Noted: 2022-08-16 | Resolved: 2023-01-04

## 2023-01-04 PROCEDURE — 99213 OFFICE O/P EST LOW 20 MIN: CPT | Mod: PBBFAC

## 2023-01-04 NOTE — PROGRESS NOTES
[facility]  [facility]  [address]  [place] [address]  Phone: [phone]  Fax: [phone]    Subjective:     Dereck Fletcher is a 22 y.o. G0 who presents  for management of  dysmenorrhea.     She presents with  Dysmenorrhea.    Patient had painful menses and was then placed on OCPs. She stopped the OCPs as they made her depressed. She then switched to Depo Provera which caused daily bleeding and also exacerbated mood symptoms. Now has no hormonal medication. She is only taking ipuprofen with her menses with mild improvement. Denies any other source of pain with bowel movements or bladder habits.     Some discomfort with deep penetration with intercourse, not using lubrication, feels attracted to partner, not using foreplay. Feels safe in relationship just sometimes no interest in intercourse due to other life stressors     OBHx:  OB History    Para Term  AB Living   0 0 0 0 0 0   SAB IAB Ectopic Multiple Live Births   0 0 0 0 0       GynHx:  LMP: 2022  STIs: Denies  Abnormal Paps: Denies  Contraception: Condoms      MedHx:   History reviewed. No pertinent past medical history.    SurgHx:   History reviewed. No pertinent surgical history.    Medications:   Ibuprofen PRN      FM Hx:   Family History   Problem Relation Age of Onset    Diabetes Father     Denies hx of ovarian, uterine, endometrial, or colon cancer.    Social Hx:   Social History     Tobacco Use    Smoking status: Every Day     Packs/day: 1.00     Types: Cigarettes    Smokeless tobacco: Never   Substance Use Topics    Alcohol use: Never    Drug use: Yes     Types: Marijuana     Comment: q month        Review of Systems  Occasional nausea. Denies fevers, chills, headache, blurry vision, vomiting, dizziness, or syncope.Denies chest pain, shortness of breath, RUQ pain, or calf pain.    Objective:     Vitals:    23 0916   BP: 108/73   Pulse: 90   Resp: 20   Temp: 97.7 °F (36.5 °C)   SpO2: 99%   Weight: 55.8 kg  "(123 lb)   Height: 5' 7" (1.702 m)     Body mass index is 19.26 kg/m².    Physical Exam:   General: alert and oriented, in no acute distress  Lungs: clear to auscultation bilaterally, no conversational dyspnea  Heart: RRR  Abdomen: Soft, non-distended, non tender to palpation, no involuntary guarding, no rebound tenderness normoactive bowel sounds  Back: No paraspinous or PSIS tenderness  Extremities: Normal, atraumatic, non-edematous, non-tender, bilaterally   External genitalia: External female genitalia without lesion, discharge or tenderness. Normal appearing urethral meatus. Normal appearing external anus.  Pelvic Pain:  intact sensation on inner thigh bilaterally, labia majora bilaterally, no ttp at any point in vestibule. Right levator anii, piriformis, obturator nttp, Spastic left piriformis, levator ani and obturator  Speculum Exam: Vaginal mucosa without lesions, masses or erythema. Pink. Cervix well visualized, smooth in contour no masses or lesions. Os normal in appearance, no blood or discharge coming from the os    Note: RN chaperone present for entirety of exam.    Labs  H/H 8/2022 12.1/38.3  10/2022 GCCT/Trich: Negative  12/2022 NILMHPV-  Imaging  EXAMINATION:  US PELVIS COMP WITH TRANSVAG NON-OB (XPD)     CLINICAL HISTORY:  Abnormal uterine and vaginal bleeding, unspecified     COMPARISON:  None     FINDINGS:  Transabdominal and transvaginal scanning of the pelvis with grayscale, color and spectral Doppler.     The anteverted uterus measures 9-10 cm in length.  There is no endometrial thickening.  There is trace fluid along the endometrial cavity.  There are 2 heterogeneous uterine masses which statistically represent leiomyoma.  The largest is located posteriorly in the myometrium measuring up to about 2 cm.  Another anterior lower uterine segment measures up to just over 1 cm.     The ovaries are normal in appearance for patient's age with vascular flow demonstrated.  Right ovary measures 3.7 x " 3.2 x 2.0 cm.  Left ovary measures 4.0 x 2.6 x 2.4 cm.     There is no adnexal mass or significant free fluid.     Impression:     Uterine masses statistically represent leiomyoma.  No other significant abnormality.        Electronically signed by: Robert Hunt  Date:                                            07/27/2022  Time:                                           11:08    HCM:   Paps: 12/2022 NILM      Assessment:   22 y.o. G0P here for dysmenorrhea and dyspareunia    Plan:     Problem List Items Addressed This Visit          Psychiatric    Mood change     Attributes low mood to OCPs and Depo Provera, however has not been taking for some time  Not interested in counseling or SSRI/ SNRI managemnt  Likely contributing to mood symptoms, possible component of PMDD as states symptoms worse around cycle            Renal/    Dysmenorrhea     Trialed on OCPs and Depo Provera with unwanted effects. Discussed recommendation for Mirena IUD as this is local secretion of hormones and can assist with heavy menses or other form of LIZBETH  Not interested in any birth control, only using condoms, discussed how this will not assist with bleeding profile           Dyspareunia in female     Mutlifactorial, recommended using lubrication, foreplay, open communication, offered resources for emotional support given current stressors  Left pelvic floor tenderness/spasticity, pelvic floor PT and home pelvic floor yoga recommended            Oncology    Fibroid     Fibroids are small, though can gor in premenopausal patients, not interested in any hormonal management  Reinforced these are benign and if not causing issues no need to remove                Patient and plan were discussed with Dr. Valiente  .    Dari Kimbrough  [employer] [facility], PGY3

## 2023-01-05 NOTE — ASSESSMENT & PLAN NOTE
· Mutlifactorial, recommended using lubrication, foreplay, open communication, offered resources for emotional support given current stressors  · Left pelvic floor tenderness/spasticity, pelvic floor PT and home pelvic floor yoga recommended

## 2023-01-05 NOTE — ASSESSMENT & PLAN NOTE
· Fibroids are small, though can gor in premenopausal patients, not interested in any hormonal management  · Reinforced these are benign and if not causing issues no need to remove

## 2023-01-05 NOTE — ASSESSMENT & PLAN NOTE
· Trialed on OCPs and Depo Provera with unwanted effects. Discussed recommendation for Mirena IUD as this is local secretion of hormones and can assist with heavy menses or other form of LIZBETH  · Not interested in any birth control, only using condoms, discussed how this will not assist with bleeding profile

## 2023-01-05 NOTE — ASSESSMENT & PLAN NOTE
· Attributes low mood to OCPs and Depo Provera, however has not been taking for some time  · Not interested in counseling or SSRI/ SNRI managemnt  · Likely contributing to mood symptoms, possible component of PMDD as states symptoms worse around cycle

## 2023-01-28 ENCOUNTER — HOSPITAL ENCOUNTER (EMERGENCY)
Facility: HOSPITAL | Age: 23
Discharge: HOME | End: 2023-01-28
Attending: EMERGENCY MEDICINE
Payer: MEDICAID

## 2023-01-28 VITALS
BODY MASS INDEX: 18.83 KG/M2 | WEIGHT: 120 LBS | OXYGEN SATURATION: 98 % | DIASTOLIC BLOOD PRESSURE: 79 MMHG | TEMPERATURE: 98 F | HEIGHT: 67 IN | HEART RATE: 67 BPM | SYSTOLIC BLOOD PRESSURE: 123 MMHG | RESPIRATION RATE: 17 BRPM

## 2023-01-28 DIAGNOSIS — U07.1 COVID-19 VIRUS INFECTION: Primary | ICD-10-CM

## 2023-01-28 LAB
B-HCG UR QL: NEGATIVE
CTP QC/QA: YES
FLUAV AG UPPER RESP QL IA.RAPID: NOT DETECTED
FLUBV AG UPPER RESP QL IA.RAPID: NOT DETECTED
SARS-COV-2 RNA RESP QL NAA+PROBE: DETECTED
STREP A PCR (OHS): NOT DETECTED

## 2023-01-28 PROCEDURE — 87651 STREP A DNA AMP PROBE: CPT | Performed by: NURSE PRACTITIONER

## 2023-01-28 PROCEDURE — 99284 EMERGENCY DEPT VISIT MOD MDM: CPT

## 2023-01-28 PROCEDURE — 81025 URINE PREGNANCY TEST: CPT | Performed by: NURSE PRACTITIONER

## 2023-01-28 PROCEDURE — 0240U COVID/FLU A&B PCR: CPT | Performed by: NURSE PRACTITIONER

## 2023-01-28 RX ORDER — NIRMATRELVIR AND RITONAVIR 300-100 MG
KIT ORAL
Qty: 30 TABLET | Refills: 0 | Status: SHIPPED | OUTPATIENT
Start: 2023-01-28 | End: 2023-02-02

## 2023-01-28 RX ORDER — BENZONATATE 100 MG/1
100 CAPSULE ORAL 3 TIMES DAILY PRN
Qty: 20 CAPSULE | Refills: 0 | Status: SHIPPED | OUTPATIENT
Start: 2023-01-28 | End: 2023-02-07

## 2023-01-28 NOTE — ED PROVIDER NOTES
Encounter Date: 1/28/2023       History     Chief Complaint   Patient presents with    Sore Throat     Pt. C/o sore throat, cough, and dizziness x5 days     The patient presents with occas nonprod cough, sore throat, nasal congestion, occasional dizziness, subjective fever, no cp or sob, no known covid-19 exposure.  The onset was 2 days ago.  The course/duration of symptoms is constant.  The degree at present is minimal.  The exacerbating factor is none.  The relieving factor is none.  Risk factors consist of none.  Prior episodes: occasional.  Associated symptoms:  denies chest pain and denies shortness of breath.  Additional history: none.         Review of patient's allergies indicates:  No Known Allergies  History reviewed. No pertinent past medical history.  History reviewed. No pertinent surgical history.  Family History   Problem Relation Age of Onset    Diabetes Father      Social History     Tobacco Use    Smoking status: Every Day     Packs/day: 1.00     Types: Cigarettes    Smokeless tobacco: Never   Substance Use Topics    Alcohol use: Never    Drug use: Yes     Types: Marijuana     Comment: q month     Review of Systems   Constitutional:  Positive for fever.   HENT:  Positive for congestion and sore throat.    Respiratory:  Positive for cough. Negative for shortness of breath.    Cardiovascular:  Negative for chest pain.   Gastrointestinal:  Negative for nausea.   Genitourinary:  Negative for dysuria.   Musculoskeletal:  Negative for back pain.   Skin:  Negative for rash.   Neurological:  Negative for weakness.   Hematological:  Does not bruise/bleed easily.   All other systems reviewed and are negative.    Physical Exam     Initial Vitals [01/28/23 0109]   BP Pulse Resp Temp SpO2   122/81 77 19 97.6 °F (36.4 °C) 99 %      MAP       --         Physical Exam    Nursing note and vitals reviewed.  Constitutional: She appears well-developed and well-nourished.   HENT:   Head: Normocephalic and atraumatic.    Right Ear: Tympanic membrane normal.   Left Ear: Tympanic membrane normal.   Nose: Mucosal edema present.   Mouth/Throat: Uvula is midline, oropharynx is clear and moist and mucous membranes are normal.   Neck: Neck supple.   Normal range of motion.  Cardiovascular:  Normal rate, regular rhythm, normal heart sounds and intact distal pulses.           Pulmonary/Chest: Breath sounds normal.   Abdominal: Abdomen is soft. Bowel sounds are normal.   Musculoskeletal:         General: Normal range of motion.      Cervical back: Normal range of motion and neck supple.     Neurological: She is alert. She has normal strength.   Skin: Skin is warm and dry.   Psychiatric: She has a normal mood and affect.       ED Course   Procedures  Labs Reviewed   COVID/FLU A&B PCR - Abnormal; Notable for the following components:       Result Value    SARS-CoV-2 PCR Detected (*)     All other components within normal limits    Narrative:     The Xpert Xpress SARS-CoV-2/FLU/RSV plus is a rapid, multiplexed real-time PCR test intended for the simultaneous qualitative detection and differentiation of SARS-CoV-2, Influenza A, Influenza B, and respiratory syncytial virus (RSV) viral RNA in either nasopharyngeal swab or nasal swab specimens.         STREP GROUP A BY PCR - Normal    Narrative:     The Xpert Xpress Strep A test is a rapid, qualitative in vitro diagnostic test for the detection of Streptococcus pyogenes (Group A ß-hemolytic Streptococcus, Strep A) in throat swab specimens from patients with signs and symptoms of pharyngitis.     POCT URINE PREGNANCY          Imaging Results    None          Medications - No data to display  Medical Decision Making:   History:   Old Records Summarized: records from clinic visits and records from previous admission(s).  ED Management:  Care transitioned to Dr Mathews at 0200.          Attending Attestation:     Physician Attestation Statement for NP/PA:   I have conducted a face to face encounter  with this patient in addition to the NP/PA, due to NP/PA Request    Other NP/PA Attestation Additions:    History of Present Illness: 23 yo female presents with sore throat and cough with sinus congestion for the past 2 days. She reports having some intermittent lightheadedness with activity that resolves with rest. 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    Physical Exam: Well appearing well nourished well hydrated.   Tonsils 1+ without erythema or exudate. TMs clear bilateral.  Lungs clear to auscultation.   Heart RRR.   Medical Decision Making: Well appearing 23 yo female presents with flu-like symptoms for the past 2 days. Covid/flu and strep swabs obtained. Strep negative. Covid positive. Discussed symptomatic therapy to continue at home along with current CDC and [facility] recommendations for quarantine and isolation. Given strict ED return precautions. I have spoken with the patient and/or caregivers. I have explained the patient's condition, diagnoses and treatment plan based on the information available to me at this time. I have answered the patient's and/or caregiver's questions and addressed any concerns. The patient and/or caregivers have as good an understanding of the patient's diagnosis, condition and treatment plan as can be expected at this point. The vital signs have been stable. The patient's condition is stable and appropriate for discharge from the emergency department.    The patient will pursue further outpatient evaluation with the primary care physician or other designated or consulting physician as outlined in the discharge instructions. The patient and/or caregivers are agreeable to this plan of care and follow-up instructions have been explained in detail. The patient and/or caregivers have received these instructions in written format and have expressed an understanding of the discharge instructions. The patient and/or caregivers are aware that any significant  change in condition or worsening of symptoms should prompt an immediate return to this or the closest emergency department or a call to 911.                         Clinical Impression:   Final diagnoses:  [U07.1] COVID-19 virus infection (Primary)        ED Disposition Condition    Discharge Stable          ED Prescriptions       Medication Sig Dispense Start Date End Date Auth. Provider    nirmatrelvir-ritonavir (PAXLOVID, EUA,) 300 mg (150 mg x 2)-100 mg copackaged tablets (EUA) Take 3 tablets by mouth 2 (two) times daily. Each dose contains 2 nirmatrelvir (pink tablets) and 1 ritonavir (white tablet). Take all 3 tablets together 30 tablet 1/28/2023 2/2/2023 Titus Mathews DO    benzonatate (TESSALON) 100 MG capsule Take 1 capsule (100 mg total) by mouth 3 (three) times daily as needed for Cough. 20 capsule 1/28/2023 2/7/2023 Titus Mathews DO          Follow-up Information    None          Titus Mathews DO  01/28/23 0252

## 2023-01-28 NOTE — Clinical Note
"Dereck "Marino Fletcher was seen and treated in our emergency department on 1/28/2023.     COVID-19 is present in our communities across the state. There is limited testing for COVID at this time, so not all patients can be tested. In this situation, your employee meets the following criteria:    Dereck Fletcher has met the criteria for COVID-19 testing and has a POSITIVE result. She can return to work once they are asymptomatic for 24 hours without the use of fever reducing medications AND at least five days from the first positive result. A mask is recommended for 5 days post quarantine.     If you have any questions or concerns, or if I can be of further assistance, please do not hesitate to contact me.    Sincerely,             Titus Mathews, DO"

## 2023-02-05 ENCOUNTER — OFFICE VISIT (OUTPATIENT)
Dept: URGENT CARE | Facility: CLINIC | Age: 23
End: 2023-02-05
Payer: MEDICAID

## 2023-02-05 VITALS
HEART RATE: 89 BPM | HEIGHT: 67 IN | WEIGHT: 119 LBS | TEMPERATURE: 98 F | DIASTOLIC BLOOD PRESSURE: 87 MMHG | OXYGEN SATURATION: 100 % | SYSTOLIC BLOOD PRESSURE: 134 MMHG | RESPIRATION RATE: 16 BRPM | BODY MASS INDEX: 18.68 KG/M2

## 2023-02-05 DIAGNOSIS — Z11.52 ENCOUNTER FOR SCREENING FOR COVID-19: ICD-10-CM

## 2023-02-05 DIAGNOSIS — J01.90 ACUTE SINUSITIS, RECURRENCE NOT SPECIFIED, UNSPECIFIED LOCATION: Primary | ICD-10-CM

## 2023-02-05 LAB — SARS-COV-2 RNA RESP QL NAA+PROBE: NOT DETECTED

## 2023-02-05 PROCEDURE — 99213 OFFICE O/P EST LOW 20 MIN: CPT | Mod: S$PBB,,, | Performed by: NURSE PRACTITIONER

## 2023-02-05 PROCEDURE — 99213 PR OFFICE/OUTPT VISIT, EST, LEVL III, 20-29 MIN: ICD-10-PCS | Mod: S$PBB,,, | Performed by: NURSE PRACTITIONER

## 2023-02-05 PROCEDURE — 87635 SARS-COV-2 COVID-19 AMP PRB: CPT | Performed by: NURSE PRACTITIONER

## 2023-02-05 PROCEDURE — 99214 OFFICE O/P EST MOD 30 MIN: CPT | Mod: PBBFAC | Performed by: NURSE PRACTITIONER

## 2023-02-05 RX ORDER — LEVOCETIRIZINE DIHYDROCHLORIDE 5 MG/1
5 TABLET, FILM COATED ORAL NIGHTLY
Qty: 14 TABLET | Refills: 0 | Status: SHIPPED | OUTPATIENT
Start: 2023-02-05 | End: 2023-02-19

## 2023-02-05 RX ORDER — AMOXICILLIN 875 MG/1
875 TABLET, FILM COATED ORAL 2 TIMES DAILY
Qty: 20 TABLET | Refills: 0 | Status: SHIPPED | OUTPATIENT
Start: 2023-02-05 | End: 2023-02-15

## 2023-02-05 RX ORDER — METHYLPREDNISOLONE 4 MG/1
TABLET ORAL
Qty: 21 TABLET | Refills: 0 | Status: SHIPPED | OUTPATIENT
Start: 2023-02-05 | End: 2023-02-11

## 2023-02-05 NOTE — LETTER
February 5, 2023      [facility]  [facility] [address] [facility] [address]  Phone: [phone]       Patient: Dereck Fletcher   YOB: 2000  Date of Visit: 02/05/2023    To Whom It May Concern:    Eileen Fletcher  was at [facility] on 02/05/2023. The patient may return to work/school on 02/06/2023 with no restrictions. If you have any questions or concerns, or if I can be of further assistance, please do not hesitate to contact me.    Sincerely,      Jason Carrillo, NP

## 2023-02-06 NOTE — PROGRESS NOTES
"Subjective:       Patient ID: Dereck Fletcher is a 22 y.o. female.    Vitals:  height is 5' 7" (1.702 m) and weight is 54 kg (119 lb). Her oral temperature is 98.2 °F (36.8 °C). Her blood pressure is 134/87 and her pulse is 89. Her respiration is 16 and oxygen saturation is 100%.     Chief Complaint: Headache, Dizziness, and Nausea    CC as above.  Patient tested positive for COVID 01/28/2023.  States that she was taken Tessalon Perles, Paxlovid for the COVID.  States that she is still having some dizziness, sinus pressure.   Pt requesting covid retest today for work.       Constitution: Negative.   HENT:  Positive for sinus pain.    Neck: neck negative.   Cardiovascular: Negative.    Respiratory: Negative.     Neurological:  Positive for headaches.     Objective:      Physical Exam   Constitutional: She is oriented to person, place, and time. She appears well-developed.   HENT:   Head: Normocephalic.   Ears:   Right Ear: Tympanic membrane normal.   Left Ear: Tympanic membrane normal.   Nose: Congestion present. Right sinus exhibits frontal sinus tenderness. Left sinus exhibits frontal sinus tenderness.   Eyes: Conjunctivae and EOM are normal. Pupils are equal, round, and reactive to light.   Neck: Neck supple.   Cardiovascular: Normal rate, regular rhythm and normal heart sounds.   Pulmonary/Chest: Effort normal and breath sounds normal.   Musculoskeletal: Normal range of motion.         General: Normal range of motion.   Neurological: She is alert and oriented to person, place, and time.   Skin: Skin is warm and dry.   Psychiatric: Her behavior is normal.   Vitals reviewed.      Assessment:       1. Acute sinusitis, recurrence not specified, unspecified location    2. Encounter for screening for COVID-19              No visits with results within 1 Day(s) from this visit.   Latest known visit with results is:   Admission on 01/28/2023, Discharged on 01/28/2023   Component Date Value Ref Range Status    POC Preg " Test, Ur 01/28/2023 Negative  Negative Final     Acceptable 01/28/2023 Yes   Final    STREP A PCR ([facility]) 01/28/2023 Not Detected  Not Detected Final    Influenza A PCR 01/28/2023 Not Detected  Not Detected Final    Influenza B PCR 01/28/2023 Not Detected  Not Detected Final    SARS-CoV-2 PCR 01/28/2023 Detected (A)  Not Detected, Negative, Invalid Final        No results found.   Plan:           Medication as ordered. May use humidifier.  If any shortness of breath, wheezing, continued fevers or any new symptoms then immediately go to ER.  Covid PCR pending, will notify of abnormal results.   Acute sinusitis, recurrence not specified, unspecified location  -     amoxicillin (AMOXIL) 875 MG tablet; Take 1 tablet (875 mg total) by mouth 2 (two) times daily. for 10 days  Dispense: 20 tablet; Refill: 0  -     levocetirizine (XYZAL) 5 MG tablet; Take 1 tablet (5 mg total) by mouth every evening. for 14 days  Dispense: 14 tablet; Refill: 0  -     methylPREDNISolone (MEDROL DOSEPACK) 4 mg tablet; Take 6 tablets (24 mg total) by mouth once daily for 1 day, THEN 5 tablets (20 mg total) once daily for 1 day, THEN 4 tablets (16 mg total) once daily for 1 day, THEN 3 tablets (12 mg total) once daily for 1 day, THEN 2 tablets (8 mg total) once daily for 1 day, THEN 1 tablet (4 mg total) once daily for 1 day. use as directed.  Dispense: 21 tablet; Refill: 0  -     COVID-19 Routine Screening    Encounter for screening for COVID-19  -     COVID-19 Routine Screening

## 2023-02-16 ENCOUNTER — OFFICE VISIT (OUTPATIENT)
Dept: FAMILY MEDICINE | Facility: CLINIC | Age: 23
End: 2023-02-16
Payer: MEDICAID

## 2023-02-16 VITALS
RESPIRATION RATE: 18 BRPM | DIASTOLIC BLOOD PRESSURE: 76 MMHG | HEIGHT: 67 IN | WEIGHT: 116.5 LBS | SYSTOLIC BLOOD PRESSURE: 123 MMHG | TEMPERATURE: 98 F | HEART RATE: 89 BPM | BODY MASS INDEX: 18.28 KG/M2 | OXYGEN SATURATION: 99 %

## 2023-02-16 DIAGNOSIS — R45.86 MOOD CHANGE: ICD-10-CM

## 2023-02-16 DIAGNOSIS — Z23 FLU VACCINE NEED: Primary | ICD-10-CM

## 2023-02-16 PROCEDURE — 3074F SYST BP LT 130 MM HG: CPT | Mod: CPTII,,,

## 2023-02-16 PROCEDURE — 99213 PR OFFICE/OUTPT VISIT, EST, LEVL III, 20-29 MIN: ICD-10-PCS | Mod: S$PBB,,,

## 2023-02-16 PROCEDURE — 99213 OFFICE O/P EST LOW 20 MIN: CPT | Mod: PBBFAC,PN

## 2023-02-16 PROCEDURE — 1159F PR MEDICATION LIST DOCUMENTED IN MEDICAL RECORD: ICD-10-PCS | Mod: CPTII,,,

## 2023-02-16 PROCEDURE — 1160F PR REVIEW ALL MEDS BY PRESCRIBER/CLIN PHARMACIST DOCUMENTED: ICD-10-PCS | Mod: CPTII,,,

## 2023-02-16 PROCEDURE — 3008F PR BODY MASS INDEX (BMI) DOCUMENTED: ICD-10-PCS | Mod: CPTII,,,

## 2023-02-16 PROCEDURE — 3008F BODY MASS INDEX DOCD: CPT | Mod: CPTII,,,

## 2023-02-16 PROCEDURE — 90686 IIV4 VACC NO PRSV 0.5 ML IM: CPT | Mod: PBBFAC,PN

## 2023-02-16 PROCEDURE — 1159F MED LIST DOCD IN RCRD: CPT | Mod: CPTII,,,

## 2023-02-16 PROCEDURE — 3078F PR MOST RECENT DIASTOLIC BLOOD PRESSURE < 80 MM HG: ICD-10-PCS | Mod: CPTII,,,

## 2023-02-16 PROCEDURE — 3078F DIAST BP <80 MM HG: CPT | Mod: CPTII,,,

## 2023-02-16 PROCEDURE — 3074F PR MOST RECENT SYSTOLIC BLOOD PRESSURE < 130 MM HG: ICD-10-PCS | Mod: CPTII,,,

## 2023-02-16 PROCEDURE — 99213 OFFICE O/P EST LOW 20 MIN: CPT | Mod: S$PBB,,,

## 2023-02-16 PROCEDURE — 1160F RVW MEDS BY RX/DR IN RCRD: CPT | Mod: CPTII,,,

## 2023-02-16 NOTE — PROGRESS NOTES
"Patient Name: Dereck Fletchre   : 2000  MRN: [id number]     Subjective:   Patient ID: Dereck Fletcher is a 22 y.o. female.    Chief Complaint:   Chief Complaint   Patient presents with    Follow-up        HPI: 2023:  Patient presents to clinic today for mood change, anxiety.  Patient also amenable to receiving flu vaccine today.  Patient states that she has not been taking Lexapro for about 3 months, does not want to be on any medications for anxiety or depression currently.  Feels as though she is depressed because of her pelvic pain that she feels during intercourse.  She is been set up with pelvic floor therapist and is getting massages once a week to assist with management of this condition.  Patient has no additional complaints today, endorses recently having COVID and has had a hard time bouncing back.     2022:  Patient return to clinic today states that she is no longer taking the Zofran or Ortho-Tri-Cyclen, she states that the Zofran "made her feel bad" and the oral birth control made her nauseous.  Explained that she is not nauseous when she is not taking medicine.  Patient is requesting refill of nausea medicine stating that she was unable to tolerate the 8 mg Zofran that she requested.  Being that patient is not nauseous when she is not taking medicine will refill this 1 time for her only.  She elaborates on lots of anxiety around her regular bleeding and pelvic ultrasound that was performed.  At length discussion about compliance and sticking with treatment plan.  Patient is not open to receiving any treatment that can be performed in primary care setting such as Depo or OCPs, she will need to [facility] to [facility] for further discussion and management of her irregular bleeding.  Explain to her that she will need to be patient with treatment plan an allow medications to have time to work.  Also explained to patient that pelvic ultrasound was as previous provider " "stated a normal finding, patient has lot of anxiety that she has "tumor that will just get worse".      08/04/2022:  Patient presents today to be established for primary care her biggest complaint is abnormal uterine bleeding.  She is followed by the women's health clinic for this issue who is managing her care.  Discussed with patient the need to be patient while workups and medications are being trialed.  Patient has recently had a transvaginal ultrasound and the results were communicated to her by her gynecologist.  Patient states that she is frustrated because she has been dealing with this since November, per chart review patient did miss multiple appointments for Depo injections due to uterine bleeding so she only recently switched to oral contraceptive pills.  She has been on that for about a month in sees minimal improvement, at length discussion about remaining patient while the medication is given time to work.  Will check her blood work today to ensure she is not anemic from blood loss, patient states that she uses panty liners in has inconsistent bleeding. Otherwise patient states she is bloated feeling often and does not think the US she had completed looked at the right area.  She states that she also has pretty consistent nausea that is relieved by Zofran.  Denies any vomiting, diarrhea, blood in stool or urine.  Patient denies any changes in her dietary habits.      ROS:  Review of Systems   Constitutional:  Negative for chills, fever and weight loss.   HENT:  Negative for ear discharge, nosebleeds and tinnitus.    Eyes:  Negative for blurred vision, photophobia and pain.   Respiratory:  Negative for cough, shortness of breath, wheezing and stridor.    Cardiovascular:  Negative for chest pain, palpitations and orthopnea.   Gastrointestinal:  Negative for abdominal pain, heartburn and nausea.   Genitourinary:  Negative for dysuria, frequency, hematuria and urgency.        Postcoital pain " "  Musculoskeletal:  Negative for falls and myalgias.   Skin:  Negative for itching and rash.   Neurological:  Negative for dizziness, sensory change, speech change, focal weakness, seizures, weakness and headaches.   Endo/Heme/Allergies:  Negative for environmental allergies. Does not bruise/bleed easily.   Psychiatric/Behavioral:  Negative for hallucinations and suicidal ideas.     History:   History reviewed. No pertinent past medical history.   History reviewed. No pertinent surgical history.  Family History   Problem Relation Age of Onset    Diabetes Father       Social History     Tobacco Use    Smoking status: Every Day     Packs/day: 1.00     Types: Cigarettes    Smokeless tobacco: Never   Substance and Sexual Activity    Alcohol use: Never    Drug use: Yes     Types: Marijuana     Comment: q month    Sexual activity: Yes     Birth control/protection: Injection        Allergies: Review of patient's allergies indicates:  No Known Allergies  Objective:     Vitals:    02/16/23 1252   BP: 123/76   Pulse: 89   Resp: 18   Temp: 98.2 °F (36.8 °C)   SpO2: 99%   Weight: 52.8 kg (116 lb 8 oz)   Height: 5' 7" (1.702 m)     Body mass index is 18.25 kg/m².     Physical Examination:   Physical Exam  Vitals reviewed.   Constitutional:       Appearance: Normal appearance. She is normal weight.   HENT:      Head: Normocephalic.      Right Ear: Tympanic membrane, ear canal and external ear normal.      Left Ear: Tympanic membrane, ear canal and external ear normal.      Nose: Nose normal.      Mouth/Throat:      Mouth: Mucous membranes are moist.      Pharynx: Oropharynx is clear.   Eyes:      Extraocular Movements: Extraocular movements intact.      Conjunctiva/sclera: Conjunctivae normal.      Pupils: Pupils are equal, round, and reactive to light.   Cardiovascular:      Rate and Rhythm: Normal rate and regular rhythm.      Pulses: Normal pulses.      Heart sounds: Normal heart sounds.   Pulmonary:      Effort: Pulmonary " effort is normal.      Breath sounds: Normal breath sounds.   Abdominal:      General: Abdomen is flat. Bowel sounds are normal.      Palpations: Abdomen is soft.   Musculoskeletal:         General: Normal range of motion.      Cervical back: Normal range of motion and neck supple.   Skin:     General: Skin is warm and dry.   Neurological:      General: No focal deficit present.      Mental Status: She is alert and oriented to person, place, and time.   Psychiatric:         Mood and Affect: Mood normal.         Behavior: Behavior normal.       Assessment:     Problem List Items Addressed This Visit          Psychiatric    Mood change    Current Assessment & Plan     Previously prescribed lexapro. No longer taking and does not want medications.   Read positive daily meditations, avoid negative media, set healthy boundaries.  Exercise daily, keep consistent sleep pattern, eat a healthy diet.  Establish good social support, make changes to reduce stress.  Reports any symptoms of suicidal/homicidal ideations or self harm immediately, if clinic is closed go to nearest emergency room.            Other Visit Diagnoses       Flu vaccine need    -  Primary    Relevant Orders    Influenza - Quadrivalent *Preferred* (6 months+) (PF)            Plan:   Dereck was seen today for follow-up.    Diagnoses and all orders for this visit:    Flu vaccine need  -     Influenza - Quadrivalent *Preferred* (6 months+) (PF)    Mood change       Follow up in about 6 months (around 8/16/2023) for annual health maint labs.     This note was created with the assistance of Dragon voice recognition software or phone dictation. There may be transcription errors as a result of using this technology however minimal. Effort has been made to assure accuracy of transcription but any obvious errors or omissions should be clarified with the author of the document    I spent a total of 25 minutes on the day of the visit.This includes face to face time  and non-face to face time preparing to see the patient (eg, review of tests), obtaining and/or reviewing separately obtained history, documenting clinical information in the electronic or other health record, independently interpreting results and communicating results to the patient/family/caregiver, or care coordinator.

## 2023-02-16 NOTE — ASSESSMENT & PLAN NOTE
Previously prescribed lexapro. No longer taking and does not want medications.   Read positive daily meditations, avoid negative media, set healthy boundaries.  Exercise daily, keep consistent sleep pattern, eat a healthy diet.  Establish good social support, make changes to reduce stress.  Reports any symptoms of suicidal/homicidal ideations or self harm immediately, if clinic is closed go to nearest emergency room.

## 2023-02-26 ENCOUNTER — HOSPITAL ENCOUNTER (EMERGENCY)
Facility: HOSPITAL | Age: 23
Discharge: HOME | End: 2023-02-27
Attending: EMERGENCY MEDICINE
Payer: MEDICAID
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
## 2023-02-26 DIAGNOSIS — R52 PAIN: ICD-10-CM

## 2023-02-26 LAB
B-HCG UR QL: NEGATIVE
CTP QC/QA: YES

## 2023-02-26 PROCEDURE — 96372 THER/PROPH/DIAG INJ SC/IM: CPT | Performed by: EMERGENCY MEDICINE

## 2023-02-26 PROCEDURE — 63600175 PHARM REV CODE 636 W HCPCS: Performed by: EMERGENCY MEDICINE

## 2023-02-26 PROCEDURE — 99284 EMERGENCY DEPT VISIT MOD MDM: CPT

## 2023-02-26 PROCEDURE — 81025 URINE PREGNANCY TEST: CPT | Performed by: EMERGENCY MEDICINE

## 2023-02-26 RX ORDER — DICLOFENAC SODIUM 25 MG/1
75 TABLET, DELAYED RELEASE ORAL 2 TIMES DAILY PRN
Qty: 30 TABLET | Refills: 0 | Status: SHIPPED | OUTPATIENT
Start: 2023-02-26 | End: 2023-03-08

## 2023-02-26 RX ORDER — CYCLOBENZAPRINE HCL 10 MG
10 TABLET ORAL 3 TIMES DAILY PRN
Qty: 15 TABLET | Refills: 0 | Status: SHIPPED | OUTPATIENT
Start: 2023-02-26 | End: 2023-03-03

## 2023-02-26 RX ORDER — KETOROLAC TROMETHAMINE 30 MG/ML
30 INJECTION, SOLUTION INTRAMUSCULAR; INTRAVENOUS
Status: COMPLETED | OUTPATIENT
Start: 2023-02-26 | End: 2023-02-26

## 2023-02-26 RX ADMIN — KETOROLAC TROMETHAMINE 30 MG: 30 INJECTION, SOLUTION INTRAMUSCULAR; INTRAVENOUS at 10:02

## 2023-02-27 VITALS
RESPIRATION RATE: 16 BRPM | HEIGHT: 67 IN | TEMPERATURE: 98 F | HEART RATE: 76 BPM | WEIGHT: 114.63 LBS | OXYGEN SATURATION: 98 % | DIASTOLIC BLOOD PRESSURE: 79 MMHG | BODY MASS INDEX: 17.99 KG/M2 | SYSTOLIC BLOOD PRESSURE: 111 MMHG
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
## 2023-04-11 ENCOUNTER — PATIENT MESSAGE (OUTPATIENT)
Dept: RESEARCH | Facility: HOSPITAL | Age: 23
End: 2023-04-11
Payer: MEDICAID

## 2023-04-20 ENCOUNTER — TELEPHONE (OUTPATIENT)
Dept: URGENT CARE | Facility: CLINIC | Age: 23
End: 2023-04-20

## 2023-04-20 ENCOUNTER — PATIENT MESSAGE (OUTPATIENT)
Dept: URGENT CARE | Facility: CLINIC | Age: 23
End: 2023-04-20

## 2023-04-20 ENCOUNTER — OFFICE VISIT (OUTPATIENT)
Dept: URGENT CARE | Facility: CLINIC | Age: 23
End: 2023-04-20
Payer: MEDICAID

## 2023-04-20 VITALS
DIASTOLIC BLOOD PRESSURE: 68 MMHG | SYSTOLIC BLOOD PRESSURE: 108 MMHG | WEIGHT: 117 LBS | HEART RATE: 70 BPM | HEIGHT: 67 IN | TEMPERATURE: 98 F | BODY MASS INDEX: 18.36 KG/M2 | RESPIRATION RATE: 16 BRPM | OXYGEN SATURATION: 100 %

## 2023-04-20 DIAGNOSIS — N89.8 VAGINAL ITCHING: ICD-10-CM

## 2023-04-20 DIAGNOSIS — N89.8 VAGINAL DISCHARGE: Primary | ICD-10-CM

## 2023-04-20 DIAGNOSIS — Z20.2 POSSIBLE EXPOSURE TO STD: ICD-10-CM

## 2023-04-20 DIAGNOSIS — A74.9 CHLAMYDIA: Primary | ICD-10-CM

## 2023-04-20 LAB
C TRACH DNA SPEC QL NAA+PROBE: DETECTED
CLUE CELLS VAG QL WET PREP: ABNORMAL
N GONORRHOEA DNA SPEC QL NAA+PROBE: NOT DETECTED
T VAGINALIS VAG QL WET PREP: ABNORMAL
WBC #/AREA VAG WET PREP: ABNORMAL
YEAST SPEC QL WET PREP: ABNORMAL

## 2023-04-20 PROCEDURE — 99214 OFFICE O/P EST MOD 30 MIN: CPT | Mod: PBBFAC

## 2023-04-20 PROCEDURE — 87591 N.GONORRHOEAE DNA AMP PROB: CPT

## 2023-04-20 PROCEDURE — 99213 PR OFFICE/OUTPT VISIT, EST, LEVL III, 20-29 MIN: ICD-10-PCS | Mod: S$PBB,,,

## 2023-04-20 PROCEDURE — 87661 TRICHOMONAS VAGINALIS AMPLIF: CPT | Mod: 90

## 2023-04-20 PROCEDURE — 87210 SMEAR WET MOUNT SALINE/INK: CPT

## 2023-04-20 PROCEDURE — 99213 OFFICE O/P EST LOW 20 MIN: CPT | Mod: S$PBB,,,

## 2023-04-20 RX ORDER — DOXYCYCLINE HYCLATE 100 MG
100 TABLET ORAL 2 TIMES DAILY
Qty: 14 TABLET | Refills: 0 | Status: SHIPPED | OUTPATIENT
Start: 2023-04-20 | End: 2023-04-27

## 2023-04-20 RX ORDER — FLUCONAZOLE 150 MG/1
150 TABLET ORAL DAILY
Qty: 1 TABLET | Refills: 1 | Status: SHIPPED | OUTPATIENT
Start: 2023-04-20 | End: 2023-04-21

## 2023-04-20 NOTE — PATIENT INSTRUCTIONS
No sex until all results are known. The clinic will ONLY call you for POSITIVE = ABNORMAL results. We will not call you to tell you tests are NEGATIVE = NORMAL. If you need medication to treat any conditions, it was either prescribed to you today and sent to your pharmacy, or it will be sent when providers view abnormal results.The clinic will call with ABNORMAL results and a treatment plan. Always use condoms.  Partners will need treatment for any POSITIVE STDs on your testing. They have to have their own visit, we do not automatically treat them.   Wet Prep test pending

## 2023-04-20 NOTE — PROGRESS NOTES
"Subjective:      Patient ID: Dereck Fletcher is a 22 y.o. female.    Vitals:  height is 5' 7" (1.702 m) and weight is 53.1 kg (117 lb). Her temperature is 98.4 °F (36.9 °C). Her blood pressure is 108/68 and her pulse is 70. Her respiration is 16 and oxygen saturation is 100%.     Chief Complaint: Vaginal Discharge (C/O vaginal discharge x 2 days. )    Cc as above.States she has had unprotected sex and she also used some perfume lotion. States symptoms feels similar to when she had a yeast infection.     Vaginal Discharge  The patient's primary symptoms include genital itching and vaginal discharge. The patient's pertinent negatives include no genital odor. This is a recurrent problem. The current episode started in the past 7 days. The problem occurs intermittently. The problem has been unchanged. Pertinent negatives include no dysuria. The vaginal discharge was thick, thin and yellow (thick on first and now thin). The symptoms are aggravated by intercourse.     Genitourinary:  Positive for vaginal discharge. Negative for dysuria.    Objective:     Physical Exam   Constitutional: She is oriented to person, place, and time.   HENT:   Head: Normocephalic and atraumatic.   Neck: Neck supple.   Cardiovascular: Normal rate, regular rhythm, normal heart sounds and normal pulses.   Pulmonary/Chest: Effort normal and breath sounds normal.   Abdominal: Normal appearance.   Genitourinary:    Vaginal discharge present.           Comments: Report yellow vaginal discharge     Musculoskeletal: Normal range of motion.         General: Normal range of motion.   Neurological: no focal deficit. She is alert and oriented to person, place, and time.   Skin: Skin is warm and dry.   Psychiatric: Her behavior is normal. Mood normal.     Assessment:     1. Vaginal discharge    2. Vaginal itching    3. Possible exposure to STD        Plan:       Vaginal discharge  -     Chlamydia/GC, PCR  -     T.vaginalisisc, Amplified RNA  -     Wet " Prep, Genital    Vaginal itching  -     Wet Prep, Genital    Possible exposure to STD  -     Chlamydia/GC, PCR    Tests pending  No sex until all results are known. The clinic will ONLY call you for POSITIVE = ABNORMAL results. We will not call you to tell you tests are NEGATIVE = NORMAL. If you need medication to treat any conditions, it was either prescribed to you today and sent to your pharmacy, or it will be sent when providers view abnormal results.The clinic will call with ABNORMAL results and a treatment plan. Always use condoms.  Partners will need treatment for any POSITIVE STDs on your testing. They have to have their own visit, we do not automatically treat them.

## 2023-04-20 NOTE — TELEPHONE ENCOUNTER
----- Message from Edita Contreras NP sent at 4/20/2023  5:35 PM CDT -----  Pt tested positive for yeast, diflucan prescribed.

## 2023-04-20 NOTE — PROGRESS NOTES
Pt tested positive for chlamydia, doxycycline prescribed.     Abstain from sex until completing medication.    Partners will need to be treated.

## 2023-04-23 LAB
SPECIMEN SOURCE: NORMAL
T VAGINALIS RRNA SPEC QL NAA+PROBE: NEGATIVE

## 2023-06-19 ENCOUNTER — OFFICE VISIT (OUTPATIENT)
Dept: URGENT CARE | Facility: CLINIC | Age: 23
End: 2023-06-19
Payer: MEDICAID

## 2023-06-19 VITALS
TEMPERATURE: 98 F | SYSTOLIC BLOOD PRESSURE: 113 MMHG | DIASTOLIC BLOOD PRESSURE: 74 MMHG | WEIGHT: 120 LBS | OXYGEN SATURATION: 100 % | HEIGHT: 66 IN | RESPIRATION RATE: 18 BRPM | BODY MASS INDEX: 19.29 KG/M2 | HEART RATE: 86 BPM

## 2023-06-19 DIAGNOSIS — R51.9 NONINTRACTABLE HEADACHE, UNSPECIFIED CHRONICITY PATTERN, UNSPECIFIED HEADACHE TYPE: Primary | ICD-10-CM

## 2023-06-19 DIAGNOSIS — H66.91 RIGHT OTITIS MEDIA, UNSPECIFIED OTITIS MEDIA TYPE: ICD-10-CM

## 2023-06-19 DIAGNOSIS — R11.0 NAUSEA: ICD-10-CM

## 2023-06-19 DIAGNOSIS — J01.10 SUBACUTE FRONTAL SINUSITIS: ICD-10-CM

## 2023-06-19 LAB
CTP QC/QA: YES
CTP QC/QA: YES
MOLECULAR STREP A: NEGATIVE
SARS-COV-2 RDRP RESP QL NAA+PROBE: NEGATIVE

## 2023-06-19 PROCEDURE — 87635 SARS-COV-2 COVID-19 AMP PRB: CPT | Mod: PBBFAC | Performed by: NURSE PRACTITIONER

## 2023-06-19 PROCEDURE — 99214 OFFICE O/P EST MOD 30 MIN: CPT | Mod: S$PBB,,, | Performed by: NURSE PRACTITIONER

## 2023-06-19 PROCEDURE — 99214 OFFICE O/P EST MOD 30 MIN: CPT | Mod: PBBFAC | Performed by: NURSE PRACTITIONER

## 2023-06-19 PROCEDURE — 99214 PR OFFICE/OUTPT VISIT, EST, LEVL IV, 30-39 MIN: ICD-10-PCS | Mod: S$PBB,,, | Performed by: NURSE PRACTITIONER

## 2023-06-19 PROCEDURE — 87651 STREP A DNA AMP PROBE: CPT | Mod: PBBFAC | Performed by: NURSE PRACTITIONER

## 2023-06-19 RX ORDER — LORATADINE 10 MG/1
10 TABLET ORAL DAILY
Qty: 30 TABLET | Refills: 1 | Status: SHIPPED | OUTPATIENT
Start: 2023-06-19 | End: 2024-06-18

## 2023-06-19 RX ORDER — AMOXICILLIN AND CLAVULANATE POTASSIUM 875; 125 MG/1; MG/1
1 TABLET, FILM COATED ORAL 2 TIMES DAILY
Qty: 14 TABLET | Refills: 0 | Status: SHIPPED | OUTPATIENT
Start: 2023-06-19 | End: 2023-06-26

## 2023-06-19 RX ORDER — FLUTICASONE PROPIONATE 50 MCG
2 SPRAY, SUSPENSION (ML) NASAL DAILY
Qty: 16 G | Refills: 2 | Status: SHIPPED | OUTPATIENT
Start: 2023-06-19

## 2023-06-19 RX ORDER — ONDANSETRON 4 MG/1
4 TABLET, ORALLY DISINTEGRATING ORAL EVERY 6 HOURS PRN
Qty: 16 TABLET | Refills: 0 | Status: SHIPPED | OUTPATIENT
Start: 2023-06-19 | End: 2023-06-23

## 2023-06-19 NOTE — PROGRESS NOTES
"Subjective:      Patient ID: Dereck Fletcher is a 22 y.o. female.    Vitals:  height is 5' 6" (1.676 m) and weight is 54.4 kg (120 lb). Her oral temperature is 97.8 °F (36.6 °C). Her blood pressure is 113/74 and her pulse is 86. Her respiration is 18 and oxygen saturation is 100%.     Chief Complaint: Nausea, Nasal Congestion, and Headache    HPI As stated in CC. Reports chills x 1 day yesterday. Pt denies fever, shortness of breath headache,. Abdominal pain, vomiting. LMP 5/28    Constitution: Positive for chills and sweating. Negative for fatigue and fever.   HENT:  Positive for congestion. Negative for sinus pain, sinus pressure and sore throat.    Respiratory:  Negative for cough.    Gastrointestinal:  Positive for nausea. Negative for vomiting, constipation and diarrhea.   Genitourinary:  Negative for missed menses.   Neurological:  Positive for headaches.    Objective:     Physical Exam   Constitutional: She is oriented to person, place, and time.   HENT:   Head: Normocephalic.   Ears:   Right Ear: Hearing normal. Tympanic membrane is injected. Tympanic membrane is not perforated, not erythematous and not bulging.   Left Ear: Hearing normal. Tympanic membrane is not injected, not perforated, not erythematous and not bulging.   Ears:    Nose: Rhinorrhea and congestion present.   Mouth/Throat: Mucous membranes are moist. Posterior oropharyngeal erythema present. No oropharyngeal exudate.   Eyes: Conjunctivae are normal.   Cardiovascular: Normal rate.   Pulmonary/Chest: Effort normal.   Abdominal: Bowel sounds are normal. She exhibits no distension. Soft. There is no abdominal tenderness.   Musculoskeletal: Normal range of motion.         General: Normal range of motion.   Neurological: She is alert and oriented to person, place, and time.   Skin: Skin is warm and dry.   Psychiatric: Her behavior is normal. Mood, judgment and thought content normal.   Nursing note and vitals reviewed.    Assessment:     1. " Nonintractable headache, unspecified chronicity pattern, unspecified headache type    2. Nausea    3. Subacute frontal sinusitis    4. Right otitis media, unspecified otitis media type      Results for orders placed or performed in visit on 06/19/23   POCT COVID-19 Rapid Screening   Result Value Ref Range    POC Rapid COVID Negative Negative     Acceptable Yes    POCT Strep A, Molecular   Result Value Ref Range    Molecular Strep A, POC Negative Negative     Acceptable Yes        Plan:     - OTC cold/flu products as desired for symptoms  - Plenty of fluids  - Home from work/school  - Tylenol or Motrin for pain/fever  - COVID/Strep tests NEGATIVE  Nonintractable headache, unspecified chronicity pattern, unspecified headache type  -     POCT COVID-19 Rapid Screening    Nausea  -     POCT COVID-19 Rapid Screening  -     POCT Strep A, Molecular    Subacute frontal sinusitis    Right otitis media, unspecified otitis media type    Other orders  -     ondansetron (ZOFRAN-ODT) 4 MG TbDL; Take 1 tablet (4 mg total) by mouth every 6 (six) hours as needed (nausea).  Dispense: 16 tablet; Refill: 0  -     amoxicillin-clavulanate 875-125mg (AUGMENTIN) 875-125 mg per tablet; Take 1 tablet by mouth 2 (two) times daily. for 7 days  Dispense: 14 tablet; Refill: 0  -     loratadine (CLARITIN) 10 mg tablet; Take 1 tablet (10 mg total) by mouth once daily.  Dispense: 30 tablet; Refill: 1  -     fluticasone propionate (FLONASE) 50 mcg/actuation nasal spray; 2 sprays (100 mcg total) by Each Nostril route once daily.  Dispense: 16 g; Refill: 2

## 2023-06-19 NOTE — PATIENT INSTRUCTIONS
- OTC cold/flu products as desired for symptoms  - Plenty of fluids  - Home from work/school  - Tylenol or Motrin for pain/fever  - COVID/Strep tests NEGATIVE    See patient education for guidance  - Use flonase every day for 7-14 days along with cetirizine (Zyrtec).  - Try Sudafed for a few days, or otc decongestant of choice.  - RTC if you get pain in front of your ear, behind your ear, or in your neck, or if you have fever/drainage, as you would need an antibiotic

## 2023-07-11 ENCOUNTER — PATIENT MESSAGE (OUTPATIENT)
Dept: RESEARCH | Facility: HOSPITAL | Age: 23
End: 2023-07-11
Payer: MEDICAID

## 2023-10-12 ENCOUNTER — TELEPHONE (OUTPATIENT)
Dept: GYNECOLOGY | Facility: CLINIC | Age: 23
End: 2023-10-12

## 2023-10-12 ENCOUNTER — LAB VISIT (OUTPATIENT)
Dept: LAB | Facility: HOSPITAL | Age: 23
End: 2023-10-12
Attending: NURSE PRACTITIONER
Payer: MEDICAID

## 2023-10-12 ENCOUNTER — OFFICE VISIT (OUTPATIENT)
Dept: GYNECOLOGY | Facility: CLINIC | Age: 23
End: 2023-10-12
Payer: MEDICAID

## 2023-10-12 VITALS
BODY MASS INDEX: 19.29 KG/M2 | WEIGHT: 120 LBS | OXYGEN SATURATION: 100 % | DIASTOLIC BLOOD PRESSURE: 76 MMHG | TEMPERATURE: 99 F | HEART RATE: 74 BPM | HEIGHT: 66 IN | SYSTOLIC BLOOD PRESSURE: 124 MMHG | RESPIRATION RATE: 18 BRPM

## 2023-10-12 DIAGNOSIS — N72 CERVICITIS: ICD-10-CM

## 2023-10-12 DIAGNOSIS — B37.31 CANDIDIASIS OF VAGINA: ICD-10-CM

## 2023-10-12 DIAGNOSIS — Z86.19 HX OF CHLAMYDIA INFECTION: ICD-10-CM

## 2023-10-12 DIAGNOSIS — Z11.3 ROUTINE SCREENING FOR STI (SEXUALLY TRANSMITTED INFECTION): ICD-10-CM

## 2023-10-12 DIAGNOSIS — Z01.419 WOMEN'S ANNUAL ROUTINE GYNECOLOGICAL EXAMINATION: Primary | ICD-10-CM

## 2023-10-12 DIAGNOSIS — A74.9 CHLAMYDIA: Primary | ICD-10-CM

## 2023-10-12 DIAGNOSIS — R10.2 PELVIC PAIN: ICD-10-CM

## 2023-10-12 LAB
B-HCG UR QL: NEGATIVE
C TRACH DNA SPEC QL NAA+PROBE: DETECTED
CLUE CELLS VAG QL WET PREP: ABNORMAL
CTP QC/QA: YES
HBV SURFACE AG SERPL QL IA: NONREACTIVE
HCV AB SERPL QL IA: NONREACTIVE
HIV 1+2 AB+HIV1 P24 AG SERPL QL IA: NONREACTIVE
N GONORRHOEA DNA SPEC QL NAA+PROBE: NOT DETECTED
SOURCE (OHS): ABNORMAL
T PALLIDUM AB SER QL: NONREACTIVE
T VAGINALIS VAG QL WET PREP: ABNORMAL
WBC #/AREA VAG WET PREP: ABNORMAL
YEAST SPEC QL WET PREP: ABNORMAL

## 2023-10-12 PROCEDURE — 86780 TREPONEMA PALLIDUM: CPT

## 2023-10-12 PROCEDURE — 3074F PR MOST RECENT SYSTOLIC BLOOD PRESSURE < 130 MM HG: ICD-10-PCS | Mod: CPTII,,, | Performed by: NURSE PRACTITIONER

## 2023-10-12 PROCEDURE — 87210 SMEAR WET MOUNT SALINE/INK: CPT | Performed by: NURSE PRACTITIONER

## 2023-10-12 PROCEDURE — 99213 OFFICE O/P EST LOW 20 MIN: CPT | Mod: PBBFAC | Performed by: NURSE PRACTITIONER

## 2023-10-12 PROCEDURE — 3078F DIAST BP <80 MM HG: CPT | Mod: CPTII,,, | Performed by: NURSE PRACTITIONER

## 2023-10-12 PROCEDURE — 87389 HIV-1 AG W/HIV-1&-2 AB AG IA: CPT

## 2023-10-12 PROCEDURE — 86803 HEPATITIS C AB TEST: CPT

## 2023-10-12 PROCEDURE — 36415 COLL VENOUS BLD VENIPUNCTURE: CPT

## 2023-10-12 PROCEDURE — 3074F SYST BP LT 130 MM HG: CPT | Mod: CPTII,,, | Performed by: NURSE PRACTITIONER

## 2023-10-12 PROCEDURE — 1159F MED LIST DOCD IN RCRD: CPT | Mod: CPTII,,, | Performed by: NURSE PRACTITIONER

## 2023-10-12 PROCEDURE — 1160F PR REVIEW ALL MEDS BY PRESCRIBER/CLIN PHARMACIST DOCUMENTED: ICD-10-PCS | Mod: CPTII,,, | Performed by: NURSE PRACTITIONER

## 2023-10-12 PROCEDURE — 1160F RVW MEDS BY RX/DR IN RCRD: CPT | Mod: CPTII,,, | Performed by: NURSE PRACTITIONER

## 2023-10-12 PROCEDURE — 3008F BODY MASS INDEX DOCD: CPT | Mod: CPTII,,, | Performed by: NURSE PRACTITIONER

## 2023-10-12 PROCEDURE — 99395 PR PREVENTIVE VISIT,EST,18-39: ICD-10-PCS | Mod: S$PBB,,, | Performed by: NURSE PRACTITIONER

## 2023-10-12 PROCEDURE — 3008F PR BODY MASS INDEX (BMI) DOCUMENTED: ICD-10-PCS | Mod: CPTII,,, | Performed by: NURSE PRACTITIONER

## 2023-10-12 PROCEDURE — 3078F PR MOST RECENT DIASTOLIC BLOOD PRESSURE < 80 MM HG: ICD-10-PCS | Mod: CPTII,,, | Performed by: NURSE PRACTITIONER

## 2023-10-12 PROCEDURE — 81025 URINE PREGNANCY TEST: CPT | Mod: PBBFAC | Performed by: NURSE PRACTITIONER

## 2023-10-12 PROCEDURE — 99395 PREV VISIT EST AGE 18-39: CPT | Mod: S$PBB,,, | Performed by: NURSE PRACTITIONER

## 2023-10-12 PROCEDURE — 87491 CHLMYD TRACH DNA AMP PROBE: CPT | Performed by: NURSE PRACTITIONER

## 2023-10-12 PROCEDURE — 87340 HEPATITIS B SURFACE AG IA: CPT

## 2023-10-12 PROCEDURE — 1159F PR MEDICATION LIST DOCUMENTED IN MEDICAL RECORD: ICD-10-PCS | Mod: CPTII,,, | Performed by: NURSE PRACTITIONER

## 2023-10-12 RX ORDER — DOXYCYCLINE HYCLATE 100 MG
100 TABLET ORAL 2 TIMES DAILY
Qty: 14 TABLET | Refills: 0 | Status: SHIPPED | OUTPATIENT
Start: 2023-10-12 | End: 2023-10-19

## 2023-10-12 RX ORDER — FLUCONAZOLE 150 MG/1
150 TABLET ORAL ONCE
Qty: 1 TABLET | Refills: 0 | Status: SHIPPED | OUTPATIENT
Start: 2023-10-12 | End: 2023-10-12

## 2023-10-12 NOTE — TELEPHONE ENCOUNTER
Please inform pt that she did test positive for a yeast infection and chlamydia. She needs to follow the instructions that I gave her in clinic. She also needs to report to lab in January for repeat testing so we know that she is cured.
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
## 2023-10-13 ENCOUNTER — TELEPHONE (OUTPATIENT)
Dept: GYNECOLOGY | Facility: CLINIC | Age: 23
End: 2023-10-13
Payer: MEDICAID

## 2023-10-24 ENCOUNTER — OFFICE VISIT (OUTPATIENT)
Dept: URGENT CARE | Facility: CLINIC | Age: 23
End: 2023-10-24
Payer: MEDICAID

## 2023-10-24 VITALS
RESPIRATION RATE: 16 BRPM | BODY MASS INDEX: 19.32 KG/M2 | DIASTOLIC BLOOD PRESSURE: 70 MMHG | SYSTOLIC BLOOD PRESSURE: 100 MMHG | TEMPERATURE: 99 F | WEIGHT: 120.19 LBS | HEIGHT: 66 IN | OXYGEN SATURATION: 98 % | HEART RATE: 70 BPM

## 2023-10-24 DIAGNOSIS — R09.89 SYMPTOMS OF UPPER RESPIRATORY INFECTION (URI): Primary | ICD-10-CM

## 2023-10-24 DIAGNOSIS — J02.9 SORE THROAT: ICD-10-CM

## 2023-10-24 PROCEDURE — 99213 PR OFFICE/OUTPT VISIT, EST, LEVL III, 20-29 MIN: ICD-10-PCS | Mod: S$PBB,,, | Performed by: NURSE PRACTITIONER

## 2023-10-24 PROCEDURE — 87651 STREP A DNA AMP PROBE: CPT | Mod: PBBFAC | Performed by: NURSE PRACTITIONER

## 2023-10-24 PROCEDURE — 87635 SARS-COV-2 COVID-19 AMP PRB: CPT | Mod: PBBFAC | Performed by: NURSE PRACTITIONER

## 2023-10-24 PROCEDURE — 99213 OFFICE O/P EST LOW 20 MIN: CPT | Mod: S$PBB,,, | Performed by: NURSE PRACTITIONER

## 2023-10-24 PROCEDURE — 87804 INFLUENZA ASSAY W/OPTIC: CPT | Mod: 59,PBBFAC | Performed by: NURSE PRACTITIONER

## 2023-10-24 PROCEDURE — 99214 OFFICE O/P EST MOD 30 MIN: CPT | Mod: PBBFAC | Performed by: NURSE PRACTITIONER

## 2023-10-24 RX ORDER — FLUCONAZOLE 150 MG/1
TABLET ORAL
COMMUNITY
Start: 2023-10-12 | End: 2023-10-24

## 2023-10-24 NOTE — PROGRESS NOTES
"Subjective:       Patient ID: Dereck Fletcher is a 23 y.o. female.    Vitals:  height is 5' 6" (1.676 m) and weight is 54.5 kg (120 lb 3.2 oz). Her temperature is 98.6 °F (37 °C). Her blood pressure is 100/70 and her pulse is 70. Her respiration is 16 and oxygen saturation is 98%.     Chief Complaint: URI (Sore throat, congestion x 2 days)    23 year old female presents to [facility] with complaints of nasal congestion and sore throat x 2 days. Denies cough and fever. Has Claritin and Flonase at home but not taking regularly. Significant other with similar symptoms.         URI   Associated symptoms include congestion and a sore throat. Pertinent negatives include no abdominal pain, coughing, diarrhea, ear pain, nausea, rash or vomiting.       Constitution: Negative for fever.   HENT:  Positive for congestion and sore throat. Negative for ear pain.    Cardiovascular:  Negative for sob on exertion.   Eyes:  Negative for eye discharge.   Respiratory:  Negative for cough and shortness of breath.    Gastrointestinal:  Negative for abdominal pain, nausea, vomiting and diarrhea.   Skin:  Negative for rash.       Objective:      Physical Exam   Constitutional: She is oriented to person, place, and time.  Non-toxic appearance. No distress.   HENT:   Head: Normocephalic and atraumatic.   Ears:   Right Ear: Tympanic membrane and ear canal normal.   Left Ear: Tympanic membrane and ear canal normal.   Nose: Congestion (turbinates pale and swollen bilaterally) present. No rhinorrhea.   Mouth/Throat: Mucous membranes are moist. No oropharyngeal exudate or posterior oropharyngeal erythema. Oropharynx is clear.   Eyes: Conjunctivae are normal. Pupils are equal, round, and reactive to light.   Neck: Neck supple.   Cardiovascular: Normal rate, regular rhythm and normal pulses.   Pulmonary/Chest: Effort normal and breath sounds normal. No respiratory distress.   Abdominal: Normal appearance and bowel sounds are normal. She exhibits no " distension. Soft. There is no abdominal tenderness.   Musculoskeletal: Normal range of motion.         General: Normal range of motion.   Lymphadenopathy:     She has no cervical adenopathy.   Neurological: no focal deficit. She is alert and oriented to person, place, and time.   Skin: Skin is warm and dry. Capillary refill takes less than 2 seconds.   Psychiatric: Mood and thought content normal.   Nursing note and vitals reviewed.        Assessment:       1. Symptoms of upper respiratory infection (URI)    2. Sore throat          Plan:         Symptoms of upper respiratory infection (URI)  -     POCT COVID-19 Rapid Screening  -     POCT Influenza A/B  -     POCT Strep A, Molecular  -Take home Claritin and Flonase as previously prescribed.   -May use nasal saline spray as needed.   -May do warm water gargles as needed.   -Please follow instructions on patient education material.  Return to urgent care in 2 to 3 days if symptoms are not improving, immediately if you develop any new or worsening symptoms.  Sore throat  -     POCT COVID-19 Rapid Screening  -     POCT Influenza A/B  -     POCT Strep A, Molecular  -Take home Claritin and Flonase as previously prescribed.   -May use nasal saline spray as needed.   -May do warm water gargles as needed.   -Please follow instructions on patient education material.  Return to urgent care in 2 to 3 days if symptoms are not improving, immediately if you develop any new or worsening symptoms.         Results for orders placed or performed in visit on 10/24/23   POCT COVID-19 Rapid Screening   Result Value Ref Range    POC Rapid COVID Negative Negative     Acceptable Yes    POCT Influenza A/B   Result Value Ref Range    Rapid Influenza A Ag Negative Negative    Rapid Influenza B Ag Negative Negative     Acceptable Yes    POCT Strep A, Molecular   Result Value Ref Range    Molecular Strep A, POC Negative Negative     Acceptable Yes

## 2023-10-24 NOTE — LETTER
October 24, 2023      [facility]  [facility] [facility] [address]  Phone: [phone]       Patient: Dereck Fletcher   YOB: 2000  Date of Visit: 10/24/2023    To Whom It May Concern:    Kerry Fletcher  was at [facility] on 10/24/2023. The patient may return to work/school on 10/25/23 with no restrictions. If you have any questions or concerns, or if I can be of further assistance, please do not hesitate to contact me.    Sincerely,    NOÉ Gregorio NP

## 2023-10-24 NOTE — PATIENT INSTRUCTIONS
-Take home Claritin and Flonase as previously prescribed.   -May use nasal saline spray as needed.   -May do warm water gargles as needed.   -Please follow instructions on patient education material.  Return to urgent care in 2 to 3 days if symptoms are not improving, immediately if you develop any new or worsening symptoms.

## 2023-12-08 ENCOUNTER — IMMUNIZATION (OUTPATIENT)
Dept: URGENT CARE | Facility: CLINIC | Age: 23
End: 2023-12-08
Payer: MEDICAID

## 2023-12-08 VITALS
DIASTOLIC BLOOD PRESSURE: 72 MMHG | WEIGHT: 123.19 LBS | SYSTOLIC BLOOD PRESSURE: 114 MMHG | HEART RATE: 73 BPM | TEMPERATURE: 98 F | BODY MASS INDEX: 19.8 KG/M2 | OXYGEN SATURATION: 100 % | HEIGHT: 66 IN | RESPIRATION RATE: 18 BRPM

## 2023-12-08 DIAGNOSIS — Z23 ENCOUNTER FOR VACCINATION: Primary | ICD-10-CM

## 2023-12-08 PROCEDURE — 90471 IMMUNIZATION ADMIN: CPT | Mod: PBBFAC

## 2024-01-15 ENCOUNTER — OFFICE VISIT (OUTPATIENT)
Dept: URGENT CARE | Facility: CLINIC | Age: 24
End: 2024-01-15
Payer: MEDICAID

## 2024-01-15 VITALS
HEIGHT: 66 IN | DIASTOLIC BLOOD PRESSURE: 71 MMHG | OXYGEN SATURATION: 99 % | WEIGHT: 124 LBS | TEMPERATURE: 98 F | RESPIRATION RATE: 18 BRPM | SYSTOLIC BLOOD PRESSURE: 104 MMHG | BODY MASS INDEX: 19.93 KG/M2 | HEART RATE: 72 BPM

## 2024-01-15 DIAGNOSIS — N92.6 LATE MENSES: ICD-10-CM

## 2024-01-15 DIAGNOSIS — N89.8 VAGINAL ITCHING: ICD-10-CM

## 2024-01-15 DIAGNOSIS — N76.0 ACUTE VAGINITIS: Primary | ICD-10-CM

## 2024-01-15 DIAGNOSIS — N76.0 BV (BACTERIAL VAGINOSIS): Primary | ICD-10-CM

## 2024-01-15 DIAGNOSIS — B96.89 BV (BACTERIAL VAGINOSIS): Primary | ICD-10-CM

## 2024-01-15 DIAGNOSIS — N30.91 CYSTITIS WITH HEMATURIA: ICD-10-CM

## 2024-01-15 LAB
B-HCG UR QL: NEGATIVE
BILIRUB UR QL STRIP: NEGATIVE
C TRACH DNA SPEC QL NAA+PROBE: NOT DETECTED
CLUE CELLS VAG QL WET PREP: ABNORMAL
CTP QC/QA: YES
GLUCOSE UR QL STRIP: NEGATIVE
KETONES UR QL STRIP: NEGATIVE
LEUKOCYTE ESTERASE UR QL STRIP: POSITIVE
N GONORRHOEA DNA SPEC QL NAA+PROBE: NOT DETECTED
PH, POC UA: 6
POC BLOOD, URINE: POSITIVE
POC NITRATES, URINE: NEGATIVE
PROT UR QL STRIP: NEGATIVE
SOURCE (OHS): NORMAL
SP GR UR STRIP: 1.02 (ref 1–1.03)
T VAGINALIS VAG QL WET PREP: ABNORMAL
UROBILINOGEN UR STRIP-ACNC: ABNORMAL (ref 0.1–1.1)
WBC #/AREA VAG WET PREP: ABNORMAL
YEAST SPEC QL WET PREP: ABNORMAL

## 2024-01-15 PROCEDURE — 81003 URINALYSIS AUTO W/O SCOPE: CPT | Mod: PBBFAC | Performed by: NURSE PRACTITIONER

## 2024-01-15 PROCEDURE — 87491 CHLMYD TRACH DNA AMP PROBE: CPT | Performed by: NURSE PRACTITIONER

## 2024-01-15 PROCEDURE — 99214 OFFICE O/P EST MOD 30 MIN: CPT | Mod: S$PBB,,, | Performed by: NURSE PRACTITIONER

## 2024-01-15 PROCEDURE — 81025 URINE PREGNANCY TEST: CPT | Mod: PBBFAC | Performed by: NURSE PRACTITIONER

## 2024-01-15 PROCEDURE — 87661 TRICHOMONAS VAGINALIS AMPLIF: CPT | Performed by: NURSE PRACTITIONER

## 2024-01-15 PROCEDURE — 99213 OFFICE O/P EST LOW 20 MIN: CPT | Mod: PBBFAC | Performed by: NURSE PRACTITIONER

## 2024-01-15 PROCEDURE — 87210 SMEAR WET MOUNT SALINE/INK: CPT | Performed by: NURSE PRACTITIONER

## 2024-01-15 RX ORDER — METRONIDAZOLE 500 MG/1
500 TABLET ORAL EVERY 12 HOURS
Qty: 14 TABLET | Refills: 0 | Status: SHIPPED | OUTPATIENT
Start: 2024-01-15 | End: 2024-01-22

## 2024-01-15 RX ORDER — FLUCONAZOLE 150 MG/1
150 TABLET ORAL DAILY
Qty: 1 TABLET | Refills: 1 | Status: SHIPPED | OUTPATIENT
Start: 2024-01-15 | End: 2024-01-18

## 2024-01-15 NOTE — PROGRESS NOTES
Your vaginal swab test indicates positive clue cells which may be an indicator for bacterial vaginosis. A prescription for Flagyl has been sent to listed pharmacy. Please avoid alcohol while taking Flagyl, may cause extreme side effects. Avoid any sexual activity until prescription has been fully completed. Follow up with GYN with additional concerns.     Yeast in vaginal swab as well, Diflucan already prescribed.

## 2024-01-15 NOTE — PROGRESS NOTES
"Subjective:      Patient ID: Dereck Fletcher is a 23 y.o. female.    Vitals:  height is 5' 6" (1.676 m) and weight is 56.2 kg (124 lb). Her oral temperature is 97.8 °F (36.6 °C). Her blood pressure is 104/71 and her pulse is 72. Her respiration is 18 and oxygen saturation is 99%.     Chief Complaint: Vaginal Discharge (Patient  reports thick, white discharge, vaginal swelling, vaginal itching and redness  x 2 days )    Vaginal Discharge  The patient's primary symptoms include vaginal discharge.    As stated in chief complaint.  Patient denies any abdominal pain, nausea or vomiting, dysuria.  Patient was treated for chlamydia back in October of 2023 however patient reports no unprotected sex and her partner was also well treated.  Patient denies any suspected reinfection  Patient does report unprotected sex with 1 male partner. LMP 12/1/2023     Genitourinary:  Positive for vaginal discharge.   Skin:  Negative for erythema.      Objective:     Physical Exam   Constitutional: She is oriented to person, place, and time. She appears well-developed.  Non-toxic appearance. She does not appear ill. No distress.   HENT:   Head: Normocephalic and atraumatic.   Ears:   Right Ear: Tympanic membrane normal.   Left Ear: Tympanic membrane normal.   Nose: Nose normal. No purulent discharge. Right sinus exhibits no maxillary sinus tenderness and no frontal sinus tenderness. Left sinus exhibits no maxillary sinus tenderness and no frontal sinus tenderness.   Mouth/Throat: Uvula is midline. Mucous membranes are moist.   Eyes: Conjunctivae are normal. Right eye exhibits no discharge. Left eye exhibits no discharge. Extraocular movement intact   Neck: Neck supple. No neck rigidity present.   Cardiovascular: Normal rate, regular rhythm and normal pulses.   Pulmonary/Chest: Effort normal and breath sounds normal. No stridor. No respiratory distress. She has no wheezes. She has no rhonchi. She has no rales. She exhibits no tenderness. "   Abdominal: Normal appearance and bowel sounds are normal. She exhibits no distension and no mass. Soft. There is no abdominal tenderness. There is no rebound, no guarding, no left CVA tenderness and no right CVA tenderness. No hernia.   Genitourinary:    Vulva normal.      Vaginal discharge present.           Comments: Normal female genitalia no lumps bumps or bruises noted no vaginal rash.  And vaginal canal there is some clumpy white discharge no odor.  Mild redness to vestibule.      Musculoskeletal: Normal range of motion.         General: Normal range of motion.   Lymphadenopathy:     She has no cervical adenopathy.   Neurological: no focal deficit. She is alert and oriented to person, place, and time.   Skin: Skin is warm, dry, not diaphoretic and no rash. Capillary refill takes less than 2 seconds. No erythema   Psychiatric: Her behavior is normal. Mood, judgment and thought content normal.   Nursing note and vitals reviewed.chaperone present (male partner/family)         Assessment:     1. Acute vaginitis    2. Cystitis with hematuria    3. Late menses    4. Vaginal itching      Results for orders placed or performed in visit on 01/15/24   POCT urine pregnancy   Result Value Ref Range    POC Preg Test, Ur Negative Negative     Acceptable Yes    POCT Urinalysis, Dipstick, Automated, W/O Scope   Result Value Ref Range    POC Blood, Urine Positive (A) Negative, Positive Slide, Positive Tube    POC Bilirubin, Urine Negative Negative, Positive Slide, Positive Tube    POC Urobilinogen, Urine 0.2e.u./dl 0.1 - 1.1    POC Ketones, Urine Negative Negative, Positive Slide, Positive Tube    POC Protein, Urine Negative Negative, Positive Slide, Positive Tube    POC Nitrates, Urine Negative Negative, Positive Slide, Positive Tube    POC Glucose, Urine Negative Negative, Positive Slide, Positive Tube    pH, UA 6.0     POC Specific Gravity, Urine 1.025 (A) 1.003 - 1.029    POC Leukocytes, Urine Positive (A)  Negative, Positive Slide, Positive Tube       Plan:   Abdominal exam benign.  Vaginal exam shows white clumpy discharge.  Treated with Diflucan and will await other treatment before prescribing antibiotics.   STD/ Urine culture testing pending.  - abstain from sex until all results are known  - urine tests take up to 7 days to result  - vaginal swab tests are a same day result    - this clinic will ONLY call you for POSITIVE = ABNORMAL results.   We will not call you to tell you tests are NEGATIVE = NORMAL.  Acute vaginitis  -     Wet Prep, Genital  -     POCT urine pregnancy  -     POCT Urinalysis, Dipstick, Automated, W/O Scope  -     T.vaginalisisc, Amplified RNA  -     Chlamydia/GC, PCR    Cystitis with hematuria    Late menses  -     POCT urine pregnancy  -     POCT Urinalysis, Dipstick, Automated, W/O Scope    Vaginal itching  -     T.vaginalisisc, Amplified RNA    Other orders  -     fluconazole (DIFLUCAN) 150 MG Tab; Take 1 tablet (150 mg total) by mouth once daily. Repeat dose after 72 hours if no relief of symptoms. for 3 days  Dispense: 1 tablet; Refill: 1

## 2024-01-15 NOTE — PATIENT INSTRUCTIONS
Please follow instructions on patient education material.  Return to urgent care in 2 to 3 days if symptoms are not improving, immediately if you develop any new or worsening symptoms.     STD/ Urine culture testing pending.    - abstain from sex until all results are known  - urine tests take up to 7 days to result  - vaginal swab tests are a same day result    - this clinic will ONLY call you for POSITIVE = ABNORMAL results.   We will not call you to tell you tests are NEGATIVE = NORMAL.    - if you need medication to treat any conditions, it was either prescribed to you today and sent to your pharmacy, or it will be sent when providers view abnormal results.  - if any of your tests are abnormal, we will call you with a plan of care.  - always require condoms  - partners will need treatment if  any +STDs on your testing. They have to have their own visit, we do not automatically treat them.     - If your testing were to come back + gonorrhea and/or chlamydia infections - you will need to be treated for those with the antibiotic injection and for gonorrhea the oral antibiotics sent to your pharmacy for chlamydia and/or Trichomonas. No additional treatment for those infections would be needed.

## 2024-01-16 ENCOUNTER — TELEPHONE (OUTPATIENT)
Dept: URGENT CARE | Facility: CLINIC | Age: 24
End: 2024-01-16
Payer: MEDICAID

## 2024-01-16 NOTE — TELEPHONE ENCOUNTER
----- Message from JUANITA Coleman sent at 1/15/2024  3:44 PM CST -----  Your vaginal swab test indicates positive clue cells which may be an indicator for bacterial vaginosis. A prescription for Flagyl has been sent to listed pharmacy. Please avoid alcohol while taking Flagyl, may cause extreme side effects. Avoid any sexual activity until prescription has been fully completed. Follow up with GYN with additional concerns.     Yeast in vaginal swab as well, Diflucan already prescribed.

## 2024-01-18 LAB
SPECIMEN SOURCE: NORMAL
T VAGINALIS RRNA SPEC QL NAA+PROBE: NEGATIVE

## 2024-01-31 ENCOUNTER — OFFICE VISIT (OUTPATIENT)
Dept: GYNECOLOGY | Facility: CLINIC | Age: 24
End: 2024-01-31
Payer: MEDICAID

## 2024-01-31 VITALS
WEIGHT: 126 LBS | TEMPERATURE: 98 F | BODY MASS INDEX: 20.25 KG/M2 | OXYGEN SATURATION: 100 % | DIASTOLIC BLOOD PRESSURE: 72 MMHG | HEART RATE: 59 BPM | RESPIRATION RATE: 18 BRPM | HEIGHT: 66 IN | SYSTOLIC BLOOD PRESSURE: 110 MMHG

## 2024-01-31 DIAGNOSIS — Z30.9 ENCOUNTER FOR CONTRACEPTIVE MANAGEMENT, UNSPECIFIED TYPE: ICD-10-CM

## 2024-01-31 DIAGNOSIS — M79.18 MYALGIA OF PELVIC FLOOR: ICD-10-CM

## 2024-01-31 DIAGNOSIS — R10.2 PELVIC PAIN: ICD-10-CM

## 2024-01-31 DIAGNOSIS — Z86.19 HX OF CHLAMYDIA INFECTION: Primary | ICD-10-CM

## 2024-01-31 PROCEDURE — 99213 OFFICE O/P EST LOW 20 MIN: CPT | Mod: PBBFAC

## 2024-01-31 NOTE — PROGRESS NOTES
"  [facility] CL[facility] NOTE     Dereck Fletcher is a 23 y.o.  presenting to GYN clinic for pelvic pain.     Patient reports lower abdominal pain and cramping for the past year after being diagnosed with Chlamydia in 2023. She was prescribed Flagyl but did not finish the medication as it made her nauseous. She was diagnosed again in October and finished treatment with Flagyl at this time. Her partner was treated in October. She last had sexual intercourse in 2023 and reports severe pain with deeper penetration. She says she is scared to have intercourse due to the pain. She denies using lubrication. She reports underoging 4 sessions of pelvic floor therapy with some relief but couldn't make to back due to her schedule. Patient says she went to urgent care on January 15, 2024 for a yeast infection and endorses pain with speculum examination. She was prescribed Diflucan with resolution of her symptoms at that time. Reports majority of pain to be on the "walls of my vagina".       Gynecology  OB History          0    Para   0    Term   0       0    AB   0    Living   0         SAB   0    IAB   0    Ectopic   0    Multiple   0    Live Births   0                Past Medical History:   Diagnosis Date    Known health problems: none       Past Surgical History:   Procedure Laterality Date    NO PAST SURGERIES        Current Outpatient Medications   Medication Instructions    fluticasone propionate (FLONASE) 100 mcg, Each Nostril, Daily    loratadine (CLARITIN) 10 mg, Oral, Daily     Social History     Tobacco Use    Smoking status: Every Day     Current packs/day: 1.00     Types: Cigarettes     Passive exposure: Current    Smokeless tobacco: Never   Substance Use Topics    Alcohol use: Not Currently    Drug use: Not Currently     Types: Marijuana     Comment: q month       Review of Systems  Pertinent items are noted in HPI.     Objective:     /72 (BP Location: Right arm, " "Patient Position: Sitting, BP Method: Small (Automatic))   Pulse (!) 59   Temp 98 °F (36.7 °C) (Oral)   Resp 18   Ht 5' 6" (1.676 m)   Wt 57.2 kg (126 lb)   LMP 2024 (Exact Date)   SpO2 100%   BMI 20.34 kg/m²   Physical Exam:  Gen: Well-nourished, well-developed female appearing stated age. Alert, cooperative, in no acute distress.  HEENT: No thyromegaly, goiter, or masses  CV: rrr, no murmurs/rubs/gallops  Chest: ctabl, no wheezes/rales/rhonchi  Abdomen: Soft, non-tender, no masses.  Extrem: Extremities normal, atraumatic, non-tender calves.  External genitalia: Normal female genetalia without lesion, discharge or tenderness. No pain on Q-tip exam.  Speculum Exam: Unable to perform speculum exam 2/2 patient intolerance- pain on advancement of speculum beyond hymenal ring.   Bimanual Exam: No cervical motion tenderness.  Uterus freely mobile.  Normal size uterus. No adnexal masses.  Significant pain to palpation of bilateral levator ani muscles with palpable hypertonicity. No pain over rectum, bladder, or urethra to palpation.     Note: RN chaperone present for entirety of exam.    Assessment:       23 y.o.  here for pelvic pain.       Plan:     Pelvic pain:  Pelvic floor therapy    Problem List Items Addressed This Visit    None  Visit Diagnoses       Hx of chlamydia infection    -  Primary    Encounter for contraceptive management, unspecified type        Pelvic pain        Myalgia of pelvic floor              Pelvic pain:   - Exam consistent with pelvic floor myalgia with pain at levator ani and hypertonicity  - Referral to be placed to PFPT. Patient given referral information to reach out if she does not hear from their practice in the next few weeks,   - Discussed with patient use graduated dilators at home if she is unable to make PFPT work with her work schedule. Shown several websites with options for purchasing dilators and instructed on use.     Contraceptive management:   - Patient " counseled on contraceptive options, declines at this time.   - Currently using condoms, encouraged to continue using regularly as she is not desiring pregnancy at this time.     Return to clinic 4-6 months for follow up of symptoms with therapy.     Discussed patient and plan with Dr. Vivar.    Claudette Juan, MS3    Tasha Schneider, DO  [facility] PGY-2

## 2024-02-02 ENCOUNTER — TELEPHONE (OUTPATIENT)
Dept: GYNECOLOGY | Facility: CLINIC | Age: 24
End: 2024-02-02
Payer: MEDICAID

## 2024-02-02 DIAGNOSIS — R10.2 PELVIC PAIN: Primary | ICD-10-CM

## 2024-02-02 NOTE — TELEPHONE ENCOUNTER
Called patient and notified of referral sent to [facility], phone number and address given, instructed patient to call in 2 weeks to f/u if she doesn't hear from them

## 2024-05-31 ENCOUNTER — CLINICAL SUPPORT (OUTPATIENT)
Dept: GYNECOLOGY | Facility: CLINIC | Age: 24
End: 2024-05-31

## 2024-05-31 DIAGNOSIS — N94.10 DYSPAREUNIA IN FEMALE: Primary | ICD-10-CM

## 2024-05-31 DIAGNOSIS — R10.2 PELVIC PAIN: ICD-10-CM

## 2024-05-31 NOTE — PROGRESS NOTES
[facility] Progress Note    Primary Site: [facility]    Chief Complaint    Follow-up for dyspareunia, pelvic myalgia    HPI  Dereck Fletcher joined me via our telemedicine platform.    Briefly, patient is a 22yo G0 with pelvic myalgia and dyspareunia. She was recommended to attend PFPT, but has not yet initiated 2/2 transportation. Also recommended to use graduated dilators which patient endorses success with and minimal pain. Patient reports pain has resolved. Not currently sexually active but denies pain with dilator therapy.     I have reviewed family history, social history, medications and allergies as documented in the patient's electronic medical record.    Reports, labs, outside records, and images have been reviewed with pertinent interpretations below. See telemedicine notes records for intervening communications.    Assessment/Plan  22yo G0 with dyspareunia and pelvic myalgia.     -Pain resolved; not currently sexually active but denies pain with dilator therapy  -Encouraged follow up with PFPT if able  -Encouraged return to clinic if pain returns      ICD-10-CM ICD-9-CM    1. Dyspareunia in female  N94.10 625.0       2. Pelvic pain  R10.2 [id number]         RTC for annual exam with NP    Future Appointments   Date Time Provider Department Center   10/17/2024  9:10 AM Suzanne Mirza, JUANITA [facility] [place] [facility]      See correspondence above for plan.   Patient's needs assessed and health education provided. Patient understands risks, benefits, and alternatives of treatment prescribed above. Patient verbalizes understanding and agrees to follow plan.    Patient's identity was confirmed and confidentiality/privacy confirmed prior to visit. I certify that this visit was done via secure two-way transmission with informed consent of the patient and/or guardian.      Audio only was selected because there was no capability for video conferencing with patient.    100% of the time was counseling or  coordinating care.  Start Time: 1140  End Time: 1145    Patient and plan discussed with Dr. Valiente.    Michelle Wang MD  [facility] - PGY-2  11:48 AM 05/31/2024

## 2024-06-01 PROBLEM — R10.2 PELVIC PAIN: Status: ACTIVE | Noted: 2024-06-01

## 2024-07-31 ENCOUNTER — OFFICE VISIT (OUTPATIENT)
Dept: URGENT CARE | Facility: CLINIC | Age: 24
End: 2024-07-31

## 2024-07-31 VITALS
RESPIRATION RATE: 16 BRPM | WEIGHT: 127.19 LBS | TEMPERATURE: 99 F | DIASTOLIC BLOOD PRESSURE: 74 MMHG | HEIGHT: 66 IN | SYSTOLIC BLOOD PRESSURE: 114 MMHG | HEART RATE: 84 BPM | OXYGEN SATURATION: 99 % | BODY MASS INDEX: 20.44 KG/M2

## 2024-07-31 DIAGNOSIS — N89.8 VAGINAL DISCHARGE: Primary | ICD-10-CM

## 2024-07-31 DIAGNOSIS — N76.0 BACTERIAL VAGINOSIS: ICD-10-CM

## 2024-07-31 DIAGNOSIS — B96.89 BACTERIAL VAGINOSIS: ICD-10-CM

## 2024-07-31 DIAGNOSIS — L02.92 FURUNCLE: ICD-10-CM

## 2024-07-31 DIAGNOSIS — R10.9 ABDOMINAL PAIN, UNSPECIFIED ABDOMINAL LOCATION: ICD-10-CM

## 2024-07-31 DIAGNOSIS — B37.31 VULVOVAGINAL CANDIDIASIS: ICD-10-CM

## 2024-07-31 LAB
B-HCG UR QL: NEGATIVE
BILIRUB UR QL STRIP: NEGATIVE
C TRACH DNA SPEC QL NAA+PROBE: NOT DETECTED
CLUE CELLS VAG QL WET PREP: ABNORMAL
CTP QC/QA: YES
GLUCOSE UR QL STRIP: NEGATIVE
KETONES UR QL STRIP: POSITIVE
LEUKOCYTE ESTERASE UR QL STRIP: POSITIVE
N GONORRHOEA DNA SPEC QL NAA+PROBE: NOT DETECTED
PH, POC UA: 5.5
POC BLOOD, URINE: POSITIVE
POC NITRATES, URINE: NEGATIVE
PROT UR QL STRIP: POSITIVE
SOURCE (OHS): NORMAL
SP GR UR STRIP: 1.02 (ref 1–1.03)
T VAGINALIS VAG QL WET PREP: ABNORMAL
UROBILINOGEN UR STRIP-ACNC: ABNORMAL (ref 0.1–1.1)
WBC #/AREA VAG WET PREP: ABNORMAL
YEAST SPEC QL WET PREP: ABNORMAL

## 2024-07-31 PROCEDURE — 81025 URINE PREGNANCY TEST: CPT | Mod: PBBFAC | Performed by: FAMILY MEDICINE

## 2024-07-31 PROCEDURE — 99214 OFFICE O/P EST MOD 30 MIN: CPT | Mod: S$PBB,,, | Performed by: FAMILY MEDICINE

## 2024-07-31 PROCEDURE — 87591 N.GONORRHOEAE DNA AMP PROB: CPT | Performed by: FAMILY MEDICINE

## 2024-07-31 PROCEDURE — 81003 URINALYSIS AUTO W/O SCOPE: CPT | Mod: PBBFAC | Performed by: FAMILY MEDICINE

## 2024-07-31 PROCEDURE — 87210 SMEAR WET MOUNT SALINE/INK: CPT | Performed by: FAMILY MEDICINE

## 2024-07-31 PROCEDURE — 99214 OFFICE O/P EST MOD 30 MIN: CPT | Mod: PBBFAC | Performed by: FAMILY MEDICINE

## 2024-07-31 PROCEDURE — 87661 TRICHOMONAS VAGINALIS AMPLIF: CPT | Performed by: FAMILY MEDICINE

## 2024-07-31 RX ORDER — FLUCONAZOLE 150 MG/1
150 TABLET ORAL ONCE
Qty: 2 TABLET | Refills: 0 | Status: SHIPPED | OUTPATIENT
Start: 2024-07-31 | End: 2024-07-31

## 2024-07-31 RX ORDER — METRONIDAZOLE 500 MG/1
500 TABLET ORAL EVERY 12 HOURS
Qty: 14 TABLET | Refills: 0 | Status: SHIPPED | OUTPATIENT
Start: 2024-07-31 | End: 2024-08-07

## 2024-07-31 RX ORDER — SULFAMETHOXAZOLE AND TRIMETHOPRIM 800; 160 MG/1; MG/1
1 TABLET ORAL 2 TIMES DAILY
Qty: 20 TABLET | Refills: 0 | Status: SHIPPED | OUTPATIENT
Start: 2024-07-31 | End: 2024-08-10

## 2024-07-31 NOTE — PROGRESS NOTES
"Subjective:       Patient ID: Dereck Fletcher is a 23 y.o. female.    Vitals:  height is 5' 6" (1.676 m) and weight is 57.7 kg (127 lb 3.3 oz). Her temperature is 98.6 °F (37 °C). Her blood pressure is 114/74 and her pulse is 84. Her respiration is 16 and oxygen saturation is 99%.     Chief Complaint: Abdominal Pain (X 1day) and Referral (REQUESTS GYN)    Patient presents to urgent care with several issues:     Having several days of loose stool, abdominal cramps.  No fever.  Thinks she may have a stomach virus.  No rash.     She admits to several days of vaginal discharge.  States not sexually active.  Is already followed by PCP.  States she is already followed by gyn for fibroids.    Complaining of a 2 month history of a subcu nodule just above the left elbow.  Having an exacerbation of discomfort over the last several days.  No drainage or redness.  Again, no fever    All other systems are negative    Chart reviewed    Objective:   Physical Exam   Constitutional:  Non-toxic appearance. She does not appear ill. No distress.   Neck: Neck supple.   Abdominal: Normal appearance. She exhibits no distension. flat abdomen There is no abdominal tenderness. There is no rebound, no guarding, no left CVA tenderness and no right CVA tenderness.   Musculoskeletal:      Comments: 2 cm freely movable minimally tender subcu nodule with central punctum just above the left medial epicondyle.  Most consistent with inflamed EIC.  Seems superficial to be an epitrochlear node but that is another possibility.   Neurological: no focal deficit. She is alert.   Skin: Skin is warm, dry and not diaphoretic.   Psychiatric: Her behavior is normal. Mood normal.   Nursing note and vitals reviewed.    Results for orders placed or performed in visit on 07/31/24   POCT urine pregnancy   Result Value Ref Range    POC Preg Test, Ur Negative Negative     Acceptable Yes    POCT Urinalysis, Dipstick, Automated, W/O Scope   Result " Value Ref Range    POC Blood, Urine Positive (A) Negative    POC Bilirubin, Urine Negative Negative    POC Urobilinogen, Urine 0.2E.U./DL 0.1 - 1.1    POC Ketones, Urine Positive (A) Negative    POC Protein, Urine Positive (A) Negative    POC Nitrates, Urine Negative Negative    POC Glucose, Urine Negative Negative    pH, UA 5.5     POC Specific Gravity, Urine 1.025 1.003 - 1.029    POC Leukocytes, Urine Positive (A) Negative         Assessment:     1. Vaginal discharge    2. Abdominal pain, unspecified abdominal location    3. Furuncle            Plan:   Urine dip abnormality noted and discussed.  Patient with a vaginal discharge.  Will obtain wet prep and send urine for STI testing.  Notify if indicated.    Will give a course of Bactrim for what is likely an inflamed epidermal inclusion cyst above the left elbow.  Use warm compresses as discussed.  Follow-up with PCP.    Encouraged her to contact her PCP office to arrange for timely follow-up and any referrals that maybe necessary.    Return to urgent care if need    Vaginal discharge  -     Trichomonas vaginalis Amplified RNA  -     Chlamydia/GC, PCR  -     Wet Prep, Genital    Abdominal pain, unspecified abdominal location  -     POCT urine pregnancy  -     POCT Urinalysis, Dipstick, Automated, W/O Scope    Furuncle  -     sulfamethoxazole-trimethoprim 800-160mg (BACTRIM DS) 800-160 mg Tab; Take 1 tablet by mouth 2 (two) times daily. for 10 days  Dispense: 20 tablet; Refill: 0        Please note: This chart was completed via voice to text dictation. It may contain typographical/word recognition errors. If there are any questions, please contact the provider for final clarification.

## 2024-07-31 NOTE — LETTER
July 31, 2024      [facility]  [address][1] [address]  Phone: [phone]       Patient: Dereck Fletcher   YOB: 2000  Date of Visit: 07/31/2024    To Whom It May Concern:    Kerry Fletcher  was at [facility] on 07/31/2024. The patient may return to work/school on AUG 1 2024 with no restrictions. If you have any questions or concerns, or if I can be of further assistance, please do not hesitate to contact me.    Sincerely,    RACHELLE KISER MD

## 2024-08-01 ENCOUNTER — TELEPHONE (OUTPATIENT)
Dept: URGENT CARE | Facility: CLINIC | Age: 24
End: 2024-08-01

## 2024-08-01 NOTE — TELEPHONE ENCOUNTER
----- Message from Abilio Shea MD sent at 7/31/2024  6:34 PM CDT -----  Please notify patient that vaginal swab findings are consistent with bacterial vaginosis and a yeast infection.  Fill metronidazole and Diflucan as prescribed.  Read about BV and yeast infections.  Other results are still pending.  We will notify the patient if any of those are positive.

## 2024-08-02 LAB
SPECIMEN SOURCE: NORMAL
T VAGINALIS RRNA SPEC QL NAA+PROBE: NEGATIVE

## 2024-08-15 ENCOUNTER — OFFICE VISIT (OUTPATIENT)
Dept: URGENT CARE | Facility: CLINIC | Age: 24
End: 2024-08-15

## 2024-08-15 VITALS
TEMPERATURE: 98 F | BODY MASS INDEX: 20.14 KG/M2 | HEIGHT: 66 IN | RESPIRATION RATE: 17 BRPM | SYSTOLIC BLOOD PRESSURE: 117 MMHG | HEART RATE: 70 BPM | OXYGEN SATURATION: 100 % | WEIGHT: 125.31 LBS | DIASTOLIC BLOOD PRESSURE: 75 MMHG

## 2024-08-15 DIAGNOSIS — N89.8 VAGINAL DISCHARGE: Primary | ICD-10-CM

## 2024-08-15 DIAGNOSIS — Z11.3 SCREENING EXAMINATION FOR STD (SEXUALLY TRANSMITTED DISEASE): ICD-10-CM

## 2024-08-15 DIAGNOSIS — U07.1 COVID-19: ICD-10-CM

## 2024-08-15 DIAGNOSIS — R11.0 NAUSEA: ICD-10-CM

## 2024-08-15 LAB
B-HCG UR QL: NEGATIVE
C TRACH DNA SPEC QL NAA+PROBE: NOT DETECTED
CLUE CELLS VAG QL WET PREP: ABNORMAL
CTP QC/QA: YES
CTP QC/QA: YES
N GONORRHOEA DNA SPEC QL NAA+PROBE: NOT DETECTED
SARS-COV-2 RDRP RESP QL NAA+PROBE: POSITIVE
SOURCE (OHS): NORMAL
T VAGINALIS VAG QL WET PREP: ABNORMAL
WBC #/AREA VAG WET PREP: ABNORMAL
YEAST SPEC QL WET PREP: ABNORMAL

## 2024-08-15 PROCEDURE — 99214 OFFICE O/P EST MOD 30 MIN: CPT | Mod: PBBFAC

## 2024-08-15 PROCEDURE — 87635 SARS-COV-2 COVID-19 AMP PRB: CPT | Mod: PBBFAC

## 2024-08-15 PROCEDURE — 87210 SMEAR WET MOUNT SALINE/INK: CPT

## 2024-08-15 PROCEDURE — 99213 OFFICE O/P EST LOW 20 MIN: CPT | Mod: S$PBB,,,

## 2024-08-15 PROCEDURE — 87591 N.GONORRHOEAE DNA AMP PROB: CPT

## 2024-08-15 PROCEDURE — 87661 TRICHOMONAS VAGINALIS AMPLIF: CPT

## 2024-08-15 PROCEDURE — 81025 URINE PREGNANCY TEST: CPT | Mod: PBBFAC

## 2024-08-15 RX ORDER — ONDANSETRON 8 MG/1
8 TABLET, ORALLY DISINTEGRATING ORAL EVERY 6 HOURS PRN
Qty: 10 TABLET | Refills: 1 | Status: SHIPPED | OUTPATIENT
Start: 2024-08-15

## 2024-08-15 RX ORDER — FLUCONAZOLE 150 MG/1
150 TABLET ORAL DAILY
Qty: 2 TABLET | Refills: 0 | Status: SHIPPED | OUTPATIENT
Start: 2024-08-15 | End: 2024-08-17

## 2024-08-15 NOTE — LETTER
August 15, 2024      [facility]  [address] [address]  Phone: [phone]       Patient: Dereck Fletcher   YOB: 2000  Date of Visit: 08/15/2024    To Whom It May Concern:    Kerry Fletcher  was at [facility] on 08/15/2024. The patient may return to work/school on 08/18/2024 with no restrictions. If you have any questions or concerns, or if I can be of further assistance, please do not hesitate to contact me.    Sincerely,    JUANITA Jacobo

## 2024-08-15 NOTE — PROGRESS NOTES
"Subjective:       Patient ID: Dereck Fletcher is a 23 y.o. female.    Vitals:  height is 5' 5.98" (1.676 m) and weight is 56.8 kg (125 lb 4.8 oz). Her oral temperature is 97.7 °F (36.5 °C). Her blood pressure is 117/75 and her pulse is 70. Her respiration is 17 and oxygen saturation is 100%.     Chief Complaint: Nausea    23-year-old female presents to clinic with complaints of nausea, vaginal itching, vaginal discharge, and painful sex.  Patient reports she has a whitish discharge that does not have an odor to it for about 4 days now and reports that she has vaginal irritation and itching as well as painful sex.  Patient reports she has had unprotected sex in the last 2 weeks and is requesting STI testing at today's visit.  Patient reports she has also been having nausea, dizziness, body aches and fatigue for about 3 days.  Patient denies fever, abdominal pain, diarrhea, dysuria, chills.  Discussed positive COVID status with patient, COVID-19 precautions, and patient verbalizes understanding.    All other systems are negative    Chart reviewed    Objective:   Physical Exam   Constitutional: She appears well-developed.  Non-toxic appearance. She does not appear ill. No distress.   HENT:   Head: Normocephalic and atraumatic.   Ears:   Right Ear: Hearing, tympanic membrane, external ear and ear canal normal.   Left Ear: Hearing, tympanic membrane, external ear and ear canal normal.   Nose: Mucosal edema and rhinorrhea present. No purulent discharge. Right sinus exhibits no maxillary sinus tenderness and no frontal sinus tenderness. Left sinus exhibits no maxillary sinus tenderness and no frontal sinus tenderness.   Mouth/Throat: Uvula is midline. Posterior oropharyngeal edema and posterior oropharyngeal erythema present.   Eyes: Right eye exhibits no discharge. Left eye exhibits no discharge. Extraocular movement intact   Neck: Neck supple. No neck rigidity present.   Cardiovascular: Regular rhythm and normal heart " sounds.   Pulmonary/Chest: Effort normal and breath sounds normal. No respiratory distress. She has no wheezes. She has no rhonchi. She has no rales.   Abdominal: Bowel sounds are normal.   Lymphadenopathy:     She has no cervical adenopathy.   Neurological: She is alert.   Skin: Skin is warm, dry and not diaphoretic.   Psychiatric: Her behavior is normal.   Nursing note and vitals reviewed.      Assessment:     1. COVID-19    2. Vaginal discharge    3. Nausea    4. Screening examination for STD (sexually transmitted disease)          Results for orders placed or performed in visit on 08/15/24   POCT urine pregnancy   Result Value Ref Range    POC Preg Test, Ur Negative Negative     Acceptable Yes    POCT COVID-19 Rapid Screening   Result Value Ref Range    POC Rapid COVID Positive (A) Negative     Acceptable Yes        Plan:     Discussed COVID 19 isolation instructions, contagious precautions.  Use over-the-counter or prescribed medications for symptoms. Please read patient education material and seek care immediately if new or worsening symptoms develop.     Will notify patient of any positive results on testing and treat accordingly.  Patient can follow all test results on the patient portal.   Please practice safe sex and read patient education material. Follow up with PCP or return to urgent care if symptoms are not improving in several days. Go to the ER if symptoms worsen or new symptoms develop.     COVID-19    Vaginal discharge  -     Wet Prep, Genital    Nausea  -     POCT urine pregnancy  -     POCT COVID-19 Rapid Screening  -     ondansetron (ZOFRAN-ODT) 8 MG TbDL; Take 1 tablet (8 mg total) by mouth every 6 (six) hours as needed (Nausea/vomiting).  Dispense: 10 tablet; Refill: 1    Screening examination for STD (sexually transmitted disease)  -     Trichomonas vaginalis Amplified RNA  -     Chlamydia/GC, PCR        Please note: This chart was completed via voice to text  dictation. It may contain typographical/word recognition errors. If there are any questions, please contact the provider for final clarification.

## 2024-08-16 ENCOUNTER — TELEPHONE (OUTPATIENT)
Dept: URGENT CARE | Facility: CLINIC | Age: 24
End: 2024-08-16

## 2024-08-16 LAB
SPECIMEN SOURCE: NORMAL
T VAGINALIS RRNA SPEC QL NAA+PROBE: NEGATIVE

## 2024-10-06 ENCOUNTER — OFFICE VISIT (OUTPATIENT)
Dept: URGENT CARE | Facility: CLINIC | Age: 24
End: 2024-10-06

## 2024-10-06 VITALS
HEART RATE: 80 BPM | DIASTOLIC BLOOD PRESSURE: 67 MMHG | BODY MASS INDEX: 19.88 KG/M2 | OXYGEN SATURATION: 98 % | SYSTOLIC BLOOD PRESSURE: 105 MMHG | RESPIRATION RATE: 16 BRPM | HEIGHT: 66 IN | TEMPERATURE: 98 F | WEIGHT: 123.69 LBS

## 2024-10-06 DIAGNOSIS — Z20.2 STD EXPOSURE: Primary | ICD-10-CM

## 2024-10-06 LAB
B-HCG UR QL: NEGATIVE
BILIRUB UR QL STRIP: NEGATIVE
C TRACH DNA SPEC QL NAA+PROBE: NOT DETECTED
CTP QC/QA: YES
GLUCOSE UR QL STRIP: NEGATIVE
KETONES UR QL STRIP: NEGATIVE
LEUKOCYTE ESTERASE UR QL STRIP: NEGATIVE
N GONORRHOEA DNA SPEC QL NAA+PROBE: NOT DETECTED
PH, POC UA: 6.5
POC BLOOD, URINE: NEGATIVE
POC NITRATES, URINE: NEGATIVE
PROT UR QL STRIP: NEGATIVE
SOURCE (OHS): NORMAL
SP GR UR STRIP: 1.02 (ref 1–1.03)
UROBILINOGEN UR STRIP-ACNC: NORMAL (ref 0.1–1.1)

## 2024-10-06 PROCEDURE — 99214 OFFICE O/P EST MOD 30 MIN: CPT | Mod: S$PBB,,, | Performed by: FAMILY MEDICINE

## 2024-10-06 PROCEDURE — 99214 OFFICE O/P EST MOD 30 MIN: CPT | Mod: PBBFAC | Performed by: FAMILY MEDICINE

## 2024-10-06 PROCEDURE — 81003 URINALYSIS AUTO W/O SCOPE: CPT | Mod: PBBFAC | Performed by: FAMILY MEDICINE

## 2024-10-06 PROCEDURE — 81025 URINE PREGNANCY TEST: CPT | Mod: PBBFAC | Performed by: FAMILY MEDICINE

## 2024-10-06 PROCEDURE — 87491 CHLMYD TRACH DNA AMP PROBE: CPT | Performed by: FAMILY MEDICINE

## 2024-10-06 PROCEDURE — 87591 N.GONORRHOEAE DNA AMP PROB: CPT | Performed by: FAMILY MEDICINE

## 2024-10-06 NOTE — PROGRESS NOTES
"Subjective:       Patient ID: Dereck Fletcher is a 24 y.o. female.    Chief Complaint: Abdominal Pain (X 2days ) and std screen      Abdominal Pain      24-year-old female with abdominal pain for 2 days.  She would like to be screened for STDs.  She is self-pay, and she would like gonorrhea and chlamydia testing.  Denies discharge or known exposure.  Review of Systems   Gastrointestinal:  Positive for abdominal pain.         Objective:       Vital Signs  Temp: 98.4 °F (36.9 °C)  Pulse: 80  Resp: 16  SpO2: 98 %  BP: 105/67  Patient Position: Sitting  Pain Score:   3  Pain Loc: Abdomen  Height and Weight  Height: 5' 6" (167.6 cm)  Weight: 56.1 kg (123 lb 10.9 oz)  BSA (Calculated - sq m): 1.62 sq meters  BMI (Calculated): 20  Weight in (lb) to have BMI = 25: 154.6]  Physical Exam  Vitals reviewed.   Constitutional:       Appearance: Normal appearance.   HENT:      Head: Normocephalic and atraumatic.   Eyes:      Extraocular Movements: Extraocular movements intact.      Conjunctiva/sclera: Conjunctivae normal.   Cardiovascular:      Rate and Rhythm: Normal rate and regular rhythm.      Heart sounds: Normal heart sounds.   Pulmonary:      Breath sounds: Normal breath sounds.   Skin:     General: Skin is warm and dry.   Neurological:      General: No focal deficit present.      Mental Status: She is alert.   Psychiatric:         Mood and Affect: Mood and affect normal.         Speech: Speech normal.         Behavior: Behavior normal. Behavior is cooperative.         Thought Content: Thought content does not include homicidal or suicidal ideation.         Assessment:       Problem List Items Addressed This Visit    None  Visit Diagnoses       STD exposure    -  Primary    Relevant Orders    POCT urine pregnancy (Completed)    POCT Urinalysis, Dipstick, Automated, W/O Scope (Completed)    Chlamydia/GC, PCR            Plan:   Labs pending  Safe sex precautions  ER precautions   FU with PCP      "

## 2024-10-06 NOTE — LETTER
October 6, 2024      [facility]  [address] [address]  Phone: [phone]       Patient: Dereck Fletcher   YOB: 2000  Date of Visit: 10/06/2024    To Whom It May Concern:    Kerry Fletcher  was at [facility] on 10/06/2024. The patient may return to work/school on TODAY with no restrictions. If you have any questions or concerns, or if I can be of further assistance, please do not hesitate to contact me.    Sincerely,    EZEQUIEL SHARP MD

## 2024-10-17 ENCOUNTER — OFFICE VISIT (OUTPATIENT)
Dept: GYNECOLOGY | Facility: CLINIC | Age: 24
End: 2024-10-17

## 2024-10-17 VITALS
OXYGEN SATURATION: 99 % | DIASTOLIC BLOOD PRESSURE: 79 MMHG | HEIGHT: 66 IN | BODY MASS INDEX: 20.25 KG/M2 | TEMPERATURE: 98 F | WEIGHT: 126 LBS | SYSTOLIC BLOOD PRESSURE: 111 MMHG | HEART RATE: 84 BPM | RESPIRATION RATE: 18 BRPM

## 2024-10-17 DIAGNOSIS — F32.A DEPRESSION, UNSPECIFIED DEPRESSION TYPE: ICD-10-CM

## 2024-10-17 DIAGNOSIS — R10.2 PELVIC PAIN: ICD-10-CM

## 2024-10-17 DIAGNOSIS — Z59.9 FINANCIAL DIFFICULTIES: ICD-10-CM

## 2024-10-17 DIAGNOSIS — Z12.4 CERVICAL CANCER SCREENING: Primary | ICD-10-CM

## 2024-10-17 LAB
B-HCG UR QL: NEGATIVE
BILIRUB SERPL-MCNC: NEGATIVE MG/DL
BLOOD URINE, POC: NEGATIVE
CLUE CELLS VAG QL WET PREP: ABNORMAL
COLOR, POC UA: YELLOW
CTP QC/QA: YES
GLUCOSE UR QL STRIP: NEGATIVE
KETONES UR QL STRIP: NEGATIVE
LEUKOCYTE ESTERASE URINE, POC: NEGATIVE
NITRITE, POC UA: NEGATIVE
PH, POC UA: 7
PROTEIN, POC: NEGATIVE
SPECIFIC GRAVITY, POC UA: 1.03
T VAGINALIS VAG QL WET PREP: ABNORMAL
UROBILINOGEN, POC UA: 1
WBC #/AREA VAG WET PREP: ABNORMAL
YEAST SPEC QL WET PREP: ABNORMAL

## 2024-10-17 PROCEDURE — 87210 SMEAR WET MOUNT SALINE/INK: CPT | Performed by: NURSE PRACTITIONER

## 2024-10-17 PROCEDURE — 99213 OFFICE O/P EST LOW 20 MIN: CPT | Mod: S$PBB,,, | Performed by: NURSE PRACTITIONER

## 2024-10-17 PROCEDURE — 81001 URINALYSIS AUTO W/SCOPE: CPT | Mod: PBBFAC | Performed by: NURSE PRACTITIONER

## 2024-10-17 PROCEDURE — 99214 OFFICE O/P EST MOD 30 MIN: CPT | Mod: PBBFAC | Performed by: NURSE PRACTITIONER

## 2024-10-17 PROCEDURE — 81025 URINE PREGNANCY TEST: CPT | Mod: PBBFAC | Performed by: NURSE PRACTITIONER

## 2024-10-17 SDOH — SOCIAL DETERMINANTS OF HEALTH (SDOH): PROBLEM RELATED TO HOUSING AND ECONOMIC CIRCUMSTANCES, UNSPECIFIED: Z59.9

## 2024-10-17 NOTE — PROGRESS NOTES
Patient ID: Dereck Fletcher is a 24 y.o. female.    Chief Complaint: Gynecologic Exam      Review of patient's allergies indicates:  No Known Allergies        HPI:  The patient is G0 scheduled for her annual gyn exam however has multiple complaints. Last pap 10/2021-NIL. Pt is uncertain of exact date of last menses as she had two periods last month and took Plan B. Pt has hx of dysmenorrhea/CPP/dyspareunia  and was following gyn resident clinic. She attended PFPT in the past but stopped as she had issues d/t transportation and did not feel it was helpful. Per gyn note, pt was also encouraged to utilize vaginal dilators but pt states never attempted.  still has chronic pelvic pain even when not on her menses. She is not on contraception.She has tried OCPs and depo provera but stopped both d/t prolonged bleeding. She did not wait the recommended rusty period to see if bleeding would improve.  uses condoms now and she does not want any other method of contraception. She does have two small fibroids and has seen gyn resident team for this and they did not recommend treatment. Pt with positive depression screening.  has always felt depressed even as a child. She lives with her step mother which causes her stress. She feels safe but does not have a great relationship with her. Her mother lives in [place] and cannot stay with her as her jobs are here in [place] and she has better opportunities here. Her main stressor is her finances. States her medicaid  and she is getting hospital bills.  She is currently trying to save money and plans to relocate to a new home when she can afford it. She has applied for low income house but is awaiting approval. Pt denies any suicidal plan and denies any past attempts. States would never do anything to harm herself as her mother  has already lost 2 kids and she would not want her mother to deal with another loss. Pt's main job is at [employer]. She  "gets along well with coworkers. Pt is not interested in any medical management and declined referral for therapy. She feels that when she moves her mood will improve.She denies suicidal ideations today.  Review of Systems:   Negative except for findings in HPI     Objective:   /79   Pulse 84   Temp 98.1 °F (36.7 °C) (Oral)   Resp 18   Ht 5' 6" (1.676 m)   Wt 57.2 kg (126 lb)   LMP 09/15/2024 (Approximate)   SpO2 99%   BMI 20.34 kg/m²    Physical Exam:  GENERAL: Pt is aware and alert and  in no acute distress.  ABDOMEN: Soft, non tender.  VULVA:  No lesions or skin changes.  URETHRA: No lesions  BLADDER: No tenderness.  VAGINA: Mucosa normal,no discharge; no lesions.  CERVIX:  no CMT, NO discharge; NO lesions  BIMANUAL EXAM: reveals a 8-week-sized uterus. The uterus is mobile, nontender, no palpable masses. Theo adnexa reveal no evidence of masses; no fullness   SKIN: Warm and Dry  PSYCHIATRIC: Patient is awake and alert. Flat affect.Denies suicidal ideations     Assessment:   Cervical cancer screening  -     Liquid-Based Pap Smear, Screening    Depression, unspecified depression type    Financial difficulties    Pelvic pain  -     POCT urine pregnancy  -     POCT urinalysis, dipstick or tablet reag  -     Wet Prep, Genital  -     Chlamydia/GC, PCR            1. Cervical cancer screening    2. Depression, unspecified depression type    3. Financial difficulties    4. Pelvic pain           -pap   -declined hiv/hep/rpr screening  -pt declined contraception.   -front office to schedule back with gyn resident clinic for continued pelvic pain  -encouraged pt to report to financial screening building today to reapply for medicaid and see if she qualifies for any further financial assistance with medical expenses  -pt denies medical management/referral for depression. She feels her mood will improve when she moves; ER precautions given for any suicidal or homicidal ideations. Information also provided for [facility]. Informed pt this is self referral and she should call herself for an appt.  Plan:       Follow up in about 1 year (around 10/17/2025).

## 2024-10-17 NOTE — LETTER
October 17, 2024      [facility]  [address] [address]  Phone: [phone]       Patient: Dereck Fletcher   Date of Visit: 10/17/2024    To Whom It May Concern:    Dereck Fletcher  was at [facility] on 10/17/2024. The patient may return to work/school on 10/18/2024 with no restrictions. If you have any questions or concerns, or if I can be of further assistance, please do not hesitate to contact me.    Sincerely,    JUANITA Shepard

## 2025-01-31 ENCOUNTER — TELEPHONE (OUTPATIENT)
Dept: GYNECOLOGY | Facility: CLINIC | Age: 25
End: 2025-01-31

## 2025-05-23 ENCOUNTER — OFFICE VISIT (OUTPATIENT)
Dept: GYNECOLOGY | Facility: CLINIC | Age: 25
End: 2025-05-23
Payer: COMMERCIAL

## 2025-05-23 VITALS
HEIGHT: 66 IN | OXYGEN SATURATION: 98 % | SYSTOLIC BLOOD PRESSURE: 117 MMHG | DIASTOLIC BLOOD PRESSURE: 82 MMHG | HEART RATE: 79 BPM | WEIGHT: 118.81 LBS | BODY MASS INDEX: 19.09 KG/M2 | TEMPERATURE: 98 F

## 2025-05-23 DIAGNOSIS — N94.6 DYSMENORRHEA: ICD-10-CM

## 2025-05-23 DIAGNOSIS — R53.83 FATIGUE, UNSPECIFIED TYPE: Primary | ICD-10-CM

## 2025-05-23 DIAGNOSIS — R10.2 PELVIC PAIN: ICD-10-CM

## 2025-05-23 LAB
C TRACH DNA SPEC QL NAA+PROBE: NOT DETECTED
CLUE CELLS VAG QL WET PREP: NORMAL
N GONORRHOEA DNA SPEC QL NAA+PROBE: NOT DETECTED
SPECIMEN SOURCE: NORMAL
T VAGINALIS VAG QL WET PREP: NORMAL
WBC #/AREA VAG WET PREP: NORMAL
YEAST SPEC QL WET PREP: NORMAL

## 2025-05-23 PROCEDURE — 99213 OFFICE O/P EST LOW 20 MIN: CPT | Mod: PBBFAC

## 2025-05-23 PROCEDURE — 87210 SMEAR WET MOUNT SALINE/INK: CPT

## 2025-05-23 PROCEDURE — 87491 CHLMYD TRACH DNA AMP PROBE: CPT

## 2025-05-23 RX ORDER — NAPROXEN 500 MG/1
500 TABLET ORAL 2 TIMES DAILY WITH MEALS
Qty: 60 TABLET | Refills: 2 | Status: SHIPPED | OUTPATIENT
Start: 2025-05-23

## 2025-05-23 NOTE — PROGRESS NOTES
"  [facility] NOTE     Dereck Fletcher is a 24 y.o.  presenting to GYN clinic for h/o pelvic pain with pelvic floor myalgia and dysmenorrhea.    Pain with intercourse when near her cycles (2-3 days prior to her cycle)  Takes ibuprofen but takes a while to kick in   Has tried PFPT but had to stop due to issues with transportation. However reports improved pain from pelvic floor myalgia and pain with intercourse  Pain worse with more exertion near cycle  She reports occasional nausea. Denies vomiting  Regular cycles. Lasts 5 days.   Reports clots as big as a quarter  Reports feels dizzy, and SOB with period.    Declines any for of hormonal treatment such as OCPs, IUD, depo provera or nexplanon. She hasd tried OCPs and depo provera and reports worsening bleeding on this.    Gynecology  OB History          0    Para   0    Term   0       0    AB   0    Living   0         SAB   0    IAB   0    Ectopic   0    Multiple   0    Live Births   0                Past Medical History:   Diagnosis Date    Irregular menstrual cycle       Past Surgical History:   Procedure Laterality Date    NO PAST SURGERIES        Current Outpatient Medications   Medication Instructions    fluticasone propionate (FLONASE) 100 mcg, Each Nostril, Daily    loratadine (CLARITIN) 10 mg, Oral, Daily    naproxen (NAPROSYN) 500 mg, Oral, 2 times daily with meals, TAKE 1 TABLET TWICE A DAY3 DAYS PRIOR TO YOUR CYCLE    ondansetron (ZOFRAN-ODT) 8 mg, Oral, Every 6 hours PRN     Social History[1]    Review of Systems  Pertinent items are noted in HPI.     Objective:     /82   Pulse 79   Temp 98.4 °F (36.9 °C)   Ht 5' 6" (1.676 m)   Wt 53.9 kg (118 lb 12.8 oz)   LMP 2025   SpO2 98%   BMI 19.17 kg/m²   Physical Exam:  Gen: Well-nourished, well-developed female appearing stated age. Alert, cooperative, in no acute distress.  CV: regular rate  Chest: non-labored breathing   Abdomen: Soft, non-tender, no " masses.  Extrem: Extremities normal, atraumatic, non-tender calves.    PELVIC EXAM:  EXTERNAL GENITALIA: normal urethral meatus; NEFG, labia &amp; vulva without lesions or irritation, no acanthosis, no LA.  Q-tip test positive at 6 o'clock (reports intercourse yesterday)  VAGINA: well-estrogenized, no discharge, no lesions, non-inflamed, no urethral hypermobility, no pelvic relaxation  CERVIX: non-inflamed, without abnormal discharge, without CMT, without abnormal lesions, no cervical prolapse; cultures done  UTERUS: 6 week size uterus, NT, mobile, moderate descent  ADNEXA: no masses appreciated, NT  RECTO-VAGINAL EXAM: no nodularity  PELVIC FLOOR: levator ani nttp, obturator internus mildly ttp, piriformis mm mildly ttp; urethra  nttp; bladder neck nttp  NEURO: normal anal wink and bulbocavernosus reflex. Sensory intact.   Note: RN chaperone present for entirety of exam.    Assessment:       24 y.o.  here for follow up of pelvic pain with pelvic floor myalgia (improved), and dysmenorrhea.  1. Fatigue, unspecified type  CBC Without Differential      2. Pelvic pain  Wet Prep, Genital    Chlamydia/GC, PCR      3. Dysmenorrhea               Plan:         Problem List Items Addressed This Visit       Dysmenorrhea    Trialed OCPS and Depo provera with worsening bleeding.  Declines IUD, nexplanon   Discussed naproxen use BID to start 2-3 days prior to cycle and continue through cycle. Patient amenable to plan.         Pelvic pain    Pelvic floor myalgia, s/p PFPT had done some sessions but stopped due to transportation.  Reports pain has improved from that standpoint  Encouraged to continue at home exercises,a nd consider restarting PFPT when possible         Relevant Orders    Wet Prep, Genital (Completed)    Chlamydia/GC, PCR (Completed)     Other Visit Diagnoses         Fatigue, unspecified type    -  Primary    Relevant Orders    CBC Without Differential            Return to clinic s scheduled for annual in  10/2025 or prn if any new concerns arise.    Discussed patient and plan with Steffanie.    Roosevelt Rosas MD PGY3  Obstetrics & Gynecology   05/23/2025         [1]   Social History  Tobacco Use    Smoking status: Former     Current packs/day: 1.00     Types: Cigarettes     Passive exposure: Current    Smokeless tobacco: Never   Substance Use Topics    Alcohol use: Not Currently    Drug use: Yes     Frequency: 3.0 times per week     Types: Marijuana

## 2025-05-24 ENCOUNTER — RESULTS FOLLOW-UP (OUTPATIENT)
Dept: GYNECOLOGY | Facility: CLINIC | Age: 25
End: 2025-05-24

## 2025-05-24 NOTE — ASSESSMENT & PLAN NOTE
Pelvic floor myalgia, s/p PFPT had done some sessions but stopped due to transportation.  Reports pain has improved from that standpoint  Encouraged to continue at home exercises,a nd consider restarting PFPT when possible

## 2025-05-24 NOTE — ASSESSMENT & PLAN NOTE
Trialed OCPS and Depo provera with worsening bleeding.  Declines IUD, nexplanon   Discussed naproxen use BID to start 2-3 days prior to cycle and continue through cycle. Patient amenable to plan.

## 2025-06-07 ENCOUNTER — HOSPITAL ENCOUNTER (EMERGENCY)
Facility: HOSPITAL | Age: 25
Discharge: HOME | End: 2025-06-07
Attending: EMERGENCY MEDICINE
Payer: COMMERCIAL

## 2025-06-07 VITALS
BODY MASS INDEX: 19.13 KG/M2 | DIASTOLIC BLOOD PRESSURE: 66 MMHG | SYSTOLIC BLOOD PRESSURE: 105 MMHG | RESPIRATION RATE: 16 BRPM | OXYGEN SATURATION: 99 % | HEART RATE: 58 BPM | HEIGHT: 66 IN | TEMPERATURE: 99 F | WEIGHT: 119 LBS

## 2025-06-07 DIAGNOSIS — R11.2 NAUSEA AND VOMITING, UNSPECIFIED VOMITING TYPE: Primary | ICD-10-CM

## 2025-06-07 DIAGNOSIS — D25.9 UTERINE LEIOMYOMA, UNSPECIFIED LOCATION: ICD-10-CM

## 2025-06-07 LAB
ALBUMIN SERPL-MCNC: 3.7 G/DL (ref 3.5–5)
ALBUMIN/GLOB SERPL: 0.8 RATIO (ref 1.1–2)
ALP SERPL-CCNC: 33 UNIT/L (ref 40–150)
ALT SERPL-CCNC: 9 UNIT/L (ref 0–55)
ANION GAP SERPL CALC-SCNC: 8 MEQ/L
AST SERPL-CCNC: 15 UNIT/L (ref 11–45)
B-HCG UR QL: NEGATIVE
BACTERIA #/AREA URNS AUTO: ABNORMAL /HPF
BASOPHILS # BLD AUTO: 0.04 X10(3)/MCL
BASOPHILS NFR BLD AUTO: 0.2 %
BILIRUB SERPL-MCNC: 0.3 MG/DL
BILIRUB UR QL STRIP.AUTO: NEGATIVE
BUN SERPL-MCNC: 10.1 MG/DL (ref 7–18.7)
C TRACH DNA SPEC QL NAA+PROBE: NOT DETECTED
CALCIUM SERPL-MCNC: 9.4 MG/DL (ref 8.4–10.2)
CHLORIDE SERPL-SCNC: 107 MMOL/L (ref 98–107)
CLARITY UR: ABNORMAL
CO2 SERPL-SCNC: 23 MMOL/L (ref 22–29)
COLOR UR AUTO: ABNORMAL
CREAT SERPL-MCNC: 0.73 MG/DL (ref 0.55–1.02)
CREAT/UREA NIT SERPL: 14
CTP QC/QA: YES
EOSINOPHIL # BLD AUTO: 0.01 X10(3)/MCL (ref 0–0.9)
EOSINOPHIL NFR BLD AUTO: 0.1 %
ERYTHROCYTE [DISTWIDTH] IN BLOOD BY AUTOMATED COUNT: 12.6 % (ref 11.5–17)
GFR SERPLBLD CREATININE-BSD FMLA CKD-EPI: >60 ML/MIN/1.73/M2
GLOBULIN SER-MCNC: 4.4 GM/DL (ref 2.4–3.5)
GLUCOSE SERPL-MCNC: 91 MG/DL (ref 74–100)
GLUCOSE UR QL STRIP: ABNORMAL
HCT VFR BLD AUTO: 39.2 % (ref 37–47)
HGB BLD-MCNC: 12.2 G/DL (ref 12–16)
HGB UR QL STRIP: ABNORMAL
HOLD SPECIMEN: NORMAL
HOLD SPECIMEN: NORMAL
IMM GRANULOCYTES # BLD AUTO: 0.07 X10(3)/MCL (ref 0–0.04)
IMM GRANULOCYTES NFR BLD AUTO: 0.4 %
KETONES UR QL STRIP: 5
LEUKOCYTE ESTERASE UR QL STRIP: 75
LYMPHOCYTES # BLD AUTO: 0.97 X10(3)/MCL (ref 0.6–4.6)
LYMPHOCYTES NFR BLD AUTO: 5.2 %
MCH RBC QN AUTO: 26.8 PG (ref 27–31)
MCHC RBC AUTO-ENTMCNC: 31.1 G/DL (ref 33–36)
MCV RBC AUTO: 86.2 FL (ref 80–94)
MONOCYTES # BLD AUTO: 0.71 X10(3)/MCL (ref 0.1–1.3)
MONOCYTES NFR BLD AUTO: 3.8 %
MUCOUS THREADS URNS QL MICRO: ABNORMAL /LPF
N GONORRHOEA DNA SPEC QL NAA+PROBE: NOT DETECTED
NEUTROPHILS # BLD AUTO: 16.77 X10(3)/MCL (ref 2.1–9.2)
NEUTROPHILS NFR BLD AUTO: 90.3 %
NITRITE UR QL STRIP: NEGATIVE
NRBC BLD AUTO-RTO: 0 %
PH UR STRIP: 5.5 [PH]
PLATELET # BLD AUTO: 369 X10(3)/MCL (ref 130–400)
PMV BLD AUTO: 9.7 FL (ref 7.4–10.4)
POTASSIUM SERPL-SCNC: 3.9 MMOL/L (ref 3.5–5.1)
PROT SERPL-MCNC: 8.1 GM/DL (ref 6.4–8.3)
PROT UR QL STRIP: 100
RBC # BLD AUTO: 4.55 X10(6)/MCL (ref 4.2–5.4)
RBC #/AREA URNS AUTO: >=100 /HPF
SODIUM SERPL-SCNC: 138 MMOL/L (ref 136–145)
SP GR UR STRIP.AUTO: >1.03 (ref 1–1.03)
SPECIMEN SOURCE: NORMAL
SQUAMOUS #/AREA URNS LPF: ABNORMAL /HPF
UROBILINOGEN UR STRIP-ACNC: NORMAL
WBC # BLD AUTO: 18.57 X10(3)/MCL (ref 4.5–11.5)
WBC #/AREA URNS AUTO: ABNORMAL /HPF

## 2025-06-07 PROCEDURE — 25500020 PHARM REV CODE 255: Performed by: EMERGENCY MEDICINE

## 2025-06-07 PROCEDURE — 87086 URINE CULTURE/COLONY COUNT: CPT | Performed by: PHYSICIAN ASSISTANT

## 2025-06-07 PROCEDURE — 96372 THER/PROPH/DIAG INJ SC/IM: CPT | Mod: 59 | Performed by: EMERGENCY MEDICINE

## 2025-06-07 PROCEDURE — 36415 COLL VENOUS BLD VENIPUNCTURE: CPT | Performed by: PHYSICIAN ASSISTANT

## 2025-06-07 PROCEDURE — 81025 URINE PREGNANCY TEST: CPT | Performed by: PHYSICIAN ASSISTANT

## 2025-06-07 PROCEDURE — 96372 THER/PROPH/DIAG INJ SC/IM: CPT | Performed by: PHYSICIAN ASSISTANT

## 2025-06-07 PROCEDURE — 63600175 PHARM REV CODE 636 W HCPCS: Mod: JZ,TB | Performed by: PHYSICIAN ASSISTANT

## 2025-06-07 PROCEDURE — 85025 COMPLETE CBC W/AUTO DIFF WBC: CPT | Performed by: PHYSICIAN ASSISTANT

## 2025-06-07 PROCEDURE — 25000003 PHARM REV CODE 250: Performed by: EMERGENCY MEDICINE

## 2025-06-07 PROCEDURE — 63600175 PHARM REV CODE 636 W HCPCS: Performed by: EMERGENCY MEDICINE

## 2025-06-07 PROCEDURE — 99285 EMERGENCY DEPT VISIT HI MDM: CPT | Mod: 25

## 2025-06-07 PROCEDURE — 96360 HYDRATION IV INFUSION INIT: CPT

## 2025-06-07 PROCEDURE — 81015 MICROSCOPIC EXAM OF URINE: CPT | Performed by: PHYSICIAN ASSISTANT

## 2025-06-07 PROCEDURE — 80053 COMPREHEN METABOLIC PANEL: CPT | Performed by: PHYSICIAN ASSISTANT

## 2025-06-07 PROCEDURE — 87591 N.GONORRHOEAE DNA AMP PROB: CPT | Performed by: PHYSICIAN ASSISTANT

## 2025-06-07 PROCEDURE — 25000003 PHARM REV CODE 250: Performed by: PHYSICIAN ASSISTANT

## 2025-06-07 RX ORDER — METRONIDAZOLE 500 MG/1
500 TABLET ORAL
Status: COMPLETED | OUTPATIENT
Start: 2025-06-07 | End: 2025-06-07

## 2025-06-07 RX ORDER — KETOROLAC TROMETHAMINE 30 MG/ML
30 INJECTION, SOLUTION INTRAMUSCULAR; INTRAVENOUS
Status: COMPLETED | OUTPATIENT
Start: 2025-06-07 | End: 2025-06-07

## 2025-06-07 RX ORDER — DOXYCYCLINE HYCLATE 100 MG
100 TABLET ORAL
Status: COMPLETED | OUTPATIENT
Start: 2025-06-07 | End: 2025-06-07

## 2025-06-07 RX ORDER — ONDANSETRON 4 MG/1
4 TABLET, ORALLY DISINTEGRATING ORAL
Status: COMPLETED | OUTPATIENT
Start: 2025-06-07 | End: 2025-06-07

## 2025-06-07 RX ORDER — METRONIDAZOLE 500 MG/1
500 TABLET ORAL EVERY 12 HOURS
Qty: 28 TABLET | Refills: 0 | Status: SHIPPED | OUTPATIENT
Start: 2025-06-07 | End: 2025-06-21

## 2025-06-07 RX ORDER — CEFTRIAXONE 500 MG/1
500 INJECTION, POWDER, FOR SOLUTION INTRAMUSCULAR; INTRAVENOUS
Status: COMPLETED | OUTPATIENT
Start: 2025-06-07 | End: 2025-06-07

## 2025-06-07 RX ORDER — ONDANSETRON 4 MG/1
4 TABLET, ORALLY DISINTEGRATING ORAL EVERY 8 HOURS PRN
Qty: 12 TABLET | Refills: 1 | Status: SHIPPED | OUTPATIENT
Start: 2025-06-07

## 2025-06-07 RX ORDER — ACETAMINOPHEN AND CODEINE PHOSPHATE 300; 30 MG/1; MG/1
1 TABLET ORAL
COMMUNITY
Start: 2025-05-01

## 2025-06-07 RX ORDER — PENICILLIN V POTASSIUM 500 MG/1
500 TABLET, FILM COATED ORAL 4 TIMES DAILY
COMMUNITY
Start: 2025-05-01

## 2025-06-07 RX ORDER — DOXYCYCLINE 100 MG/1
100 CAPSULE ORAL 2 TIMES DAILY
Qty: 28 CAPSULE | Refills: 0 | Status: SHIPPED | OUTPATIENT
Start: 2025-06-07 | End: 2025-06-21

## 2025-06-07 RX ADMIN — KETOROLAC TROMETHAMINE 30 MG: 60 INJECTION, SOLUTION INTRAMUSCULAR at 06:06

## 2025-06-07 RX ADMIN — METRONIDAZOLE 500 MG: 500 TABLET, FILM COATED ORAL at 10:06

## 2025-06-07 RX ADMIN — CEFTRIAXONE SODIUM 500 MG: 500 INJECTION, POWDER, FOR SOLUTION INTRAMUSCULAR; INTRAVENOUS at 10:06

## 2025-06-07 RX ADMIN — SODIUM CHLORIDE 1000 ML: 9 INJECTION, SOLUTION INTRAVENOUS at 08:06

## 2025-06-07 RX ADMIN — DOXYCYCLINE HYCLATE 100 MG: 100 TABLET, COATED ORAL at 10:06

## 2025-06-07 RX ADMIN — ONDANSETRON 4 MG: 4 TABLET, ORALLY DISINTEGRATING ORAL at 06:06

## 2025-06-07 RX ADMIN — IOHEXOL 100 ML: 350 INJECTION, SOLUTION INTRAVENOUS at 09:06

## 2025-06-08 NOTE — DISCHARGE INSTRUCTIONS
As discussed, you have a uterine fibroid that is likely slowly enlarging.     Follow up with Gynecology about this, this can affect pregnancy plans in the future.      We are treating you for possible STD, no definite test results available yet but we will let you know if they do show infection.      Take the medications as prescribed.      Return if worse.

## 2025-06-08 NOTE — ED PROVIDER NOTES
Encounter Date: 6/7/2025       History     Chief Complaint   Patient presents with    Emesis     Lower abdominal pain, menses, and vomiting starting this afternoon.      24-year-old female presents to the emergency department with complaints of bilateral lower abdominal pain and vomiting that started this afternoon.  She states she is on her menstrual cycle and at first thought she was experiencing her usual cramps however the pain worsened.  She rates pain 9/10.    The history is provided by the patient. No  was used.     Review of patient's allergies indicates:  No Known Allergies  Past Medical History:   Diagnosis Date    Irregular menstrual cycle      Past Surgical History:   Procedure Laterality Date    NO PAST SURGERIES       Family History   Problem Relation Name Age of Onset    No Known Problems Mother      Diabetes Father       Social History[1]  Review of Systems   Gastrointestinal:  Positive for abdominal pain (Bilateral lower), nausea and vomiting.       Physical Exam     Initial Vitals [06/07/25 1815]   BP Pulse Resp Temp SpO2   130/78 64 15 98 °F (36.7 °C) 100 %      MAP       --         Physical Exam    Nursing note and vitals reviewed.  Constitutional: She appears well-developed and well-nourished.   Cardiovascular:  Normal rate, regular rhythm, normal heart sounds and intact distal pulses.           Pulmonary/Chest: Breath sounds normal.   Abdominal: Abdomen is soft. Bowel sounds are normal. There is abdominal tenderness (Bilateral lower). There is no rebound and no guarding.   Musculoskeletal:         General: Normal range of motion.     Neurological: She is alert.   Skin: Skin is warm.         ED Course   Procedures  Labs Reviewed   URINALYSIS, REFLEX TO URINE CULTURE - Abnormal       Result Value    Color, UA Red-brown (*)     Appearance, UA Cloudy (*)     Specific Gravity, UA >1.030 (*)     pH, UA 5.5      Protein,  (*)     Glucose, UA 1+ (*)     Ketones, UA 5 (*)      Blood, UA 3+ (*)     Bilirubin, UA Negative      Urobilinogen, UA Normal      Nitrites, UA Negative      Leukocyte Esterase, UA 75 (*)     RBC, UA >=100 (*)     WBC, UA 11-20 (*)     Bacteria, UA Many (*)     Squamous Epithelial Cells, UA Few      Mucous, UA Trace (*)    COMPREHENSIVE METABOLIC PANEL - Abnormal    Sodium 138      Potassium 3.9      Chloride 107      CO2 23      Glucose 91      Blood Urea Nitrogen 10.1      Creatinine 0.73      Calcium 9.4      Protein Total 8.1      Albumin 3.7      Globulin 4.4 (*)     Albumin/Globulin Ratio 0.8 (*)     Bilirubin Total 0.3      ALP 33 (*)     ALT 9      AST 15      eGFR >60      Anion Gap 8.0      BUN/Creatinine Ratio 14     CBC WITH DIFFERENTIAL - Abnormal    WBC 18.57 (*)     RBC 4.55      Hgb 12.2      Hct 39.2      MCV 86.2      MCH 26.8 (*)     MCHC 31.1 (*)     RDW 12.6      Platelet 369      MPV 9.7      Neut % 90.3      Lymph % 5.2      Mono % 3.8      Eos % 0.1      Basophil % 0.2      Imm Grans % 0.4      Neut # 16.77 (*)     Lymph # 0.97      Mono # 0.71      Eos # 0.01      Baso # 0.04      Imm Gran # 0.07 (*)     NRBC% 0.0     CHLAMYDIA/GONORRHOEAE(GC), PCR    Chlamydia trachomatis PCR Not Detected      N. gonorrhea PCR Not Detected      Source Urine      Narrative:     The Xpert CT/NG test, performed on the GeneXpert system is a qualitative in vitro real-time polymerase chain reaction (PCR) test for the automated detected and differentiation for genomic DNA from Chlamydia trachomatis (CT) and/or Neisseria gonorrhoeae (NG).   CULTURE, URINE   CBC W/ AUTO DIFFERENTIAL    Narrative:     The following orders were created for panel order CBC Auto Differential.  Procedure                               Abnormality         Status                     ---------                               -----------         ------                     CBC with Differential[[id number]]       Abnormal            Final result                 Please view results for these tests  on the individual orders.   EXTRA TUBES    Narrative:     The following orders were created for panel order EXTRA TUBES.  Procedure                               Abnormality         Status                     ---------                               -----------         ------                     Light Green Top Hold[[id number]]                                                       Lavender Top Hold[[id number]]                               Final result               Gold Top Hold[[id number]]                                   Final result                 Please view results for these tests on the individual orders.   LAVENDER TOP HOLD    Extra Tube Hold for add-ons.     GOLD TOP HOLD    Extra Tube Hold for add-ons.     EXTRA TUBES    Narrative:     The following orders were created for panel order EXTRA TUBES.  Procedure                               Abnormality         Status                     ---------                               -----------         ------                     Light Blue Top Hold[[id number]]                                                          Please view results for these tests on the individual orders.   LIGHT BLUE TOP HOLD   POCT URINE PREGNANCY    POC Preg Test, Ur Negative       Acceptable Yes            Imaging Results              CT Abdomen Pelvis With IV Contrast NO Oral Contrast (Preliminary result)  Result time 06/07/25 22:17:14      Preliminary result by Kwame Wheeler MD (06/07/25 22:17:14)                   Narrative:    START OF REPORT:  Technique: CT of the abdomen and pelvis was performed with axial images as well as sagittal and coronal reconstruction images with intravenous contrast.    Comparison: None available.    Clinical History: Nausea/vomiting, Abdominal pain, acute, nonlocalized, lower abdominal pain, N/V.    Dosage Information: Automated Exposure Control was utilized 142.10 mGy.cm.    Findings:  Thorax:  Lungs: The visualized lung bases appear  unremarkable.  Pleura: No effusions or thickening.  Abdomen:  Abdominal Wall: No abdominal wall pathology is seen.  Liver: The liver appears unremarkable. There is a small 0.6 cm enhancing focus on the right hepatic lobe seen on series 2 image 31. A small hemangioma is considered. Correlate with clinical and laboratory findings as regards additional evaluation and follow-up.  Biliary System: No intrahepatic or extrahepatic biliary duct dilatation is seen.  Gallbladder: The gallbladder is non-distended and appears otherwise unremarkable.  Spleen: The spleen appears unremarkable.  Adrenals: The adrenal glands appear unremarkable.  Kidneys: The kidneys appear unremarkable with no stones cysts masses or hydronephrosis.  Aorta: The visualized abdominal aorta appears unremarkable.  Bowel:  Esophagus: The visualized distal esophagus appears unremarkable.  Stomach: The stomach appears unremarkable.  Duodenum: Unremarkable appearing duodenum.  Small Bowel: The small bowel appears unremarkable.  Colon: Nondistended.  Appendix: The appendix is not identified but no inflammatory changes are seen in the right lower quadrant to suggest appendicitis.  Peritoneum: No free intraperitoneal air is seen. Trace free fluid is seen in the pelvis.    Pelvis:  Bladder: The bladder is nondistended but appears otherwise unremarkable.  Female:  Uterus: There is a single heterogeneous mass with heterogeneous enhancement in the posterior corpus of the of the uterus which measures 4.3 x 4 cm in diameter. This is compatible with a large uterine fibroid. There is prominence of the cervical and vaginal soft tissues with surrounding free fluid in the pelvis of uncertain significance with a pelvic inflammatory process not excluded.  Ovaries: The ovaries appear unremarkable with probable bilateral physiologic cysts.    Bony structures:  Dorsal Spine: The visualized dorsal spine appears unremarkable.  Bony Pelvis: The visualized bony structures of the  pelvis appear unremarkable.      Impression:  1. There is a single heterogeneous mass with heterogeneous enhancement in the posterior corpus of the of the uterus which measures 4.3 x 4 cm in diameter. This is compatible with a large uterine fibroid. There is prominence of the cervical and vaginal soft tissues with surrounding free fluid in the pelvis of uncertain significance with a pelvic inflammatory process not excluded. Correlate clinically as regards additional evaluation and follow-up.  2. No acute intraabdominal or pelvic solid organ or bowel pathology identified. Details and other findings as discussed above.                                         Medications   cefTRIAXone injection 500 mg (has no administration in time range)   metroNIDAZOLE tablet 500 mg (has no administration in time range)   doxycycline tablet 100 mg (has no administration in time range)   ketorolac injection 30 mg (30 mg Intramuscular Given 6/7/25 1852)   ondansetron disintegrating tablet 4 mg (4 mg Oral Given 6/7/25 1850)   sodium chloride 0.9% bolus 1,000 mL 1,000 mL (0 mLs Intravenous Stopped 6/7/25 2220)   iohexoL (OMNIPAQUE 350) injection 100 mL (100 mLs Intravenous Given 6/7/25 2141)     06/07/2025 10:29 PM     I have seen this patient and performed an independent face to face history and physical examination, and I agree with all evaluation and management as documented by Marguerite MARI.   I have had face-to-face time with this patient and assisted with history, exam, treatment, management, and disposition.  Additionally, I assumed complete care as of 9:00 p.m...    24 y.o. female presents with nausea vomiting, poorly localized low abdominal and pelvic discomfort as outlined.  Not pregnant, never pregnant, on her menses now.  Has had various STDs in the past.  No recent discharge or other definite STD symptoms, but CT and elevated white counter concerning for the possibility of pelvic inflammatory disease.  Abdomen and pelvis  are soft, nontender, she has no peritoneal signs, she is thin, does not want pelvic exam at present due to menses.  She is not displaced signs of sepsis.  She is aware of uterine fibroid and has known that it is slowly enlarging in size, she is counseled to follow up with Gynecology regarding this, future pregnancy plans, and to recheck current infectious symptoms.  Additional studies are still pending, proceeding with outpatient PID therapy empirically at this stage and appropriate for short-term primary care/gyn follow-up.    Robert Moe MD      10:29 PM     D/C instructions:    As discussed, you have a uterine fibroid that is likely slowly enlarging.     Follow up with Gynecology about this, this can affect pregnancy plans in the future.      We are treating you for possible STD, no definite test results available yet but we will let you know if they do show infection.      Take the medications as prescribed.      Return if worse.      Robert Moe MD    Medical Decision Making  24-year-old female presents to the emergency department with complaints of bilateral lower abdominal pain and vomiting that started this afternoon.  She states she is on her menstrual cycle and at first thought she was experiencing her usual cramps however the pain worsened.  She rates pain 9/10.    DDx:  Menstrual cramps, appendicitis, UTI, STI    Urinalysis concerning for infection however could be contaminated due to blood.  She has a white count of 18.5.  With abdominal pain will order CT abdomen and pelvis for further evaluation.    Amount and/or Complexity of Data Reviewed  Labs: ordered. Decision-making details documented in ED Course.  Radiology: ordered.    Risk  Prescription drug management.               ED Course as of 06/07/25 2231   Sat Jun 07, 2025 2029 WBC(!): 18.57 [ER]   2040 Bacteria, UA(!): Many [ER]   2040 WBC, UA(!): 11-20 [ER]   2040 Leukocyte Esterase, UA(!): 75 [ER]   2040 Ketones, UA(!): 5 [ER]      ED  Course User Index  [ER] Marguerite Huertas PA                           Clinical Impression:  Final diagnoses:  [R11.2] Nausea and vomiting, unspecified vomiting type (Primary)  [D25.9] Uterine leiomyoma, unspecified location          ED Disposition Condition    Discharge Stable          ED Prescriptions       Medication Sig Dispense Start Date End Date Auth. Provider    ondansetron (ZOFRAN-ODT) 4 MG TbDL Take 1 tablet (4 mg total) by mouth every 8 (eight) hours as needed (n/v). 12 tablet 6/7/2025 -- Robert Moe MD    doxycycline (VIBRAMYCIN) 100 MG Cap Take 1 capsule (100 mg total) by mouth 2 (two) times daily. for 14 days 28 capsule 6/7/2025 6/21/2025 Robert Moe MD    metroNIDAZOLE (FLAGYL) 500 MG tablet Take 1 tablet (500 mg total) by mouth every 12 (twelve) hours. for 14 days 28 tablet 6/7/2025 6/21/2025 Robert Moe MD          Follow-up Information       Follow up With Specialties Details Why Contact Info    [facility] Emergency Medicine  As needed [address] [facility] [address] [phone]                   [1]   Social History  Tobacco Use    Smoking status: Former     Current packs/day: 1.00     Types: Cigarettes     Passive exposure: Current    Smokeless tobacco: Never   Substance Use Topics    Alcohol use: Not Currently    Drug use: Not Currently     Types: Marijuana        Robert Moe MD  06/07/25 3066

## 2025-06-10 LAB — BACTERIA UR CULT: NORMAL

## 2025-06-30 ENCOUNTER — OFFICE VISIT (OUTPATIENT)
Dept: URGENT CARE | Facility: CLINIC | Age: 25
End: 2025-06-30
Payer: COMMERCIAL

## 2025-06-30 VITALS
TEMPERATURE: 100 F | RESPIRATION RATE: 18 BRPM | SYSTOLIC BLOOD PRESSURE: 113 MMHG | BODY MASS INDEX: 19.77 KG/M2 | WEIGHT: 123 LBS | HEIGHT: 66 IN | HEART RATE: 110 BPM | OXYGEN SATURATION: 100 % | DIASTOLIC BLOOD PRESSURE: 72 MMHG

## 2025-06-30 DIAGNOSIS — J02.9 SORE THROAT: ICD-10-CM

## 2025-06-30 DIAGNOSIS — R09.89 SYMPTOMS OF UPPER RESPIRATORY INFECTION (URI): Primary | ICD-10-CM

## 2025-06-30 LAB
CTP QC/QA: YES
MOLECULAR STREP A: NEGATIVE
POC MOLECULAR INFLUENZA A AGN: NEGATIVE
POC MOLECULAR INFLUENZA B AGN: NEGATIVE
SARS-COV+SARS-COV-2 AG RESP QL IA.RAPID: NEGATIVE

## 2025-06-30 PROCEDURE — 87502 INFLUENZA DNA AMP PROBE: CPT | Mod: PBBFAC | Performed by: NURSE PRACTITIONER

## 2025-06-30 PROCEDURE — 87811 SARS-COV-2 COVID19 W/OPTIC: CPT | Mod: PBBFAC | Performed by: NURSE PRACTITIONER

## 2025-06-30 PROCEDURE — 99214 OFFICE O/P EST MOD 30 MIN: CPT | Mod: PBBFAC | Performed by: NURSE PRACTITIONER

## 2025-06-30 PROCEDURE — 87651 STREP A DNA AMP PROBE: CPT | Mod: PBBFAC | Performed by: NURSE PRACTITIONER

## 2025-06-30 PROCEDURE — 99214 OFFICE O/P EST MOD 30 MIN: CPT | Mod: S$PBB,,, | Performed by: NURSE PRACTITIONER

## 2025-06-30 RX ORDER — ALBUTEROL SULFATE 90 UG/1
2 INHALANT RESPIRATORY (INHALATION) EVERY 6 HOURS PRN
Qty: 1 G | Refills: 0 | Status: SHIPPED | OUTPATIENT
Start: 2025-06-30

## 2025-06-30 RX ORDER — FLUTICASONE PROPIONATE 50 MCG
2 SPRAY, SUSPENSION (ML) NASAL DAILY
Qty: 18.2 ML | Refills: 0 | Status: SHIPPED | OUTPATIENT
Start: 2025-06-30

## 2025-06-30 NOTE — PATIENT INSTRUCTIONS
Please follow instructions on patient education material.      Return to urgent care in 2 to 3 days if symptoms are not improving, immediately if you develop any new or worsening symptoms.   - warm salt water gargles to your throat  - nasal saline lavage  - OTC cold/flu products as desired for symptoms  - Plenty of fluids  - Humidified air  - Tylenol or Motrin for pain/fever    Go to the ER if you experience chest pain with shortness of breath, shortness of breath when moving around your house, high fevers 103.0+, excessive vomiting/diarrhea, or general distress.

## 2025-06-30 NOTE — LETTER
June 30, 2025      [facility]  Critical access hospital[address] [facility] [address]  Phone: [phone]       Patient: Dereck Fletcher   YOB: 2000  Date of Visit: 06/30/2025    To Whom It May Concern:    Kerry Fletcher  was at [facility] on 06/30/2025. The patient may return to work/school on 07/02/2025 with no restrictions. If you have any questions or concerns, or if I can be of further assistance, please do not hesitate to contact me.    Sincerely,    CHONG GRANT

## 2025-06-30 NOTE — PROGRESS NOTES
"Subjective:      Patient ID: Dereck Fletcher is a 24 y.o. female.    Vitals:  height is 5' 6" (1.676 m) and weight is 55.8 kg (123 lb). Her temperature is 99.5 °F (37.5 °C). Her blood pressure is 113/72 and her pulse is 110. Her respiration is 18 and oxygen saturation is 100%.     Chief Complaint: URI (Pt c/o sob, sore throat , headache , runny nose x 2 days )    URI      As stated in chief complaint. Pt reports taking loratadine for symptoms.  Patient reports feelings of shortness for breath but denies trouble breathing. Pt denies fever, chest pain, dizziness, weakness, stomach pain, nausea vomiting, dysuria.   ROS   Objective:     Physical Exam   Constitutional: She is oriented to person, place, and time. She appears well-developed. She is cooperative.  Non-toxic appearance. She does not appear ill. No distress. awake  HENT:   Head: Normocephalic and atraumatic.   Ears:   Right Ear: Tympanic membrane normal.   Left Ear: Tympanic membrane normal.   Nose: Rhinorrhea and congestion present. No purulent discharge. Right sinus exhibits no maxillary sinus tenderness and no frontal sinus tenderness. Left sinus exhibits no maxillary sinus tenderness and no frontal sinus tenderness.   Mouth/Throat: Uvula is midline. Mucous membranes are moist. No oropharyngeal exudate or posterior oropharyngeal erythema.   Eyes: Conjunctivae are normal. Pupils are equal, round, and reactive to light. Right eye exhibits no discharge. Left eye exhibits no discharge. Extraocular movement intact   Neck: Neck supple. No neck rigidity present.   Cardiovascular: Normal rate, regular rhythm and normal pulses.   Pulmonary/Chest: Effort normal and breath sounds normal. No stridor. No respiratory distress. She has no wheezes. She has no rhonchi. She has no rales. She exhibits no tenderness.   Abdominal: Normal appearance and bowel sounds are normal. Soft. There is no left CVA tenderness and no right CVA tenderness.   Genitourinary:    Vulva " normal.     Musculoskeletal: Normal range of motion.         General: Normal range of motion.      Cervical back: She exhibits no tenderness.   Lymphadenopathy:        Head (right side): No submental, no submandibular, no tonsillar, no preauricular, no posterior auricular and no occipital adenopathy present.        Head (left side): No submental, no submandibular, no tonsillar, no preauricular, no posterior auricular and no occipital adenopathy present.     She has no cervical adenopathy.   Neurological: no focal deficit. She is alert and oriented to person, place, and time.   Skin: Skin is warm, dry and not diaphoretic. Capillary refill takes less than 2 seconds.   Psychiatric: Her behavior is normal. Mood, judgment and thought content normal.   Nursing note and vitals reviewed.      Assessment:     1. Symptoms of upper respiratory infection (URI)    2. Sore throat      Results for orders placed or performed in visit on 06/30/25   POCT Strep A, Molecular    Collection Time: 06/30/25  1:49 PM   Result Value Ref Range    Molecular Strep A, POC Negative Negative     Acceptable Yes    SARS Coronavirus 2 Antigen, POCT Manual Read    Collection Time: 06/30/25  1:52 PM   Result Value Ref Range    SARS Coronavirus 2 Antigen Negative Negative, Presumptive Negative     Acceptable Yes    POCT Influenza A/B Molecular    Collection Time: 06/30/25  1:54 PM   Result Value Ref Range    POC Molecular Influenza A Ag Negative Negative    POC Molecular Influenza B Ag Negative Negative     Acceptable Yes        Plan:   Strict ER precautions given and discussed.  Prescription management for upper respiratory symptoms and instructed on albuterol inhaler for shortness for breath for any worsening return to ER for evaluation  - warm salt water gargles to your throat  - nasal saline lavage  - OTC cold/flu products as desired for symptoms  - Plenty of fluids  - Humidified air  - Tylenol or Motrin  for pain/fever  Symptoms of upper respiratory infection (URI)  -     POCT Strep A, Molecular  -     SARS Coronavirus 2 Antigen, POCT Manual Read  -     POCT Influenza A/B Molecular  -     albuterol (PROVENTIL HFA) 90 mcg/actuation inhaler; Inhale 2 puffs into the lungs every 6 (six) hours as needed for Wheezing or Shortness of Breath. Rescue  Dispense: 1 g; Refill: 0    Sore throat  -     albuterol (PROVENTIL HFA) 90 mcg/actuation inhaler; Inhale 2 puffs into the lungs every 6 (six) hours as needed for Wheezing or Shortness of Breath. Rescue  Dispense: 1 g; Refill: 0    Other orders  -     fluticasone propionate (FLONASE) 50 mcg/actuation nasal spray; 2 sprays (100 mcg total) by Each Nostril route once daily.  Dispense: 18.2 mL; Refill: 0